# Patient Record
Sex: FEMALE | Race: WHITE | Employment: UNEMPLOYED | URBAN - METROPOLITAN AREA
[De-identification: names, ages, dates, MRNs, and addresses within clinical notes are randomized per-mention and may not be internally consistent; named-entity substitution may affect disease eponyms.]

---

## 2017-03-01 ENCOUNTER — APPOINTMENT (OUTPATIENT)
Dept: MRI IMAGING | Age: 67
End: 2017-03-01
Attending: EMERGENCY MEDICINE
Payer: MEDICARE

## 2017-03-01 ENCOUNTER — HOSPITAL ENCOUNTER (EMERGENCY)
Age: 67
Discharge: HOME | End: 2017-03-01
Attending: EMERGENCY MEDICINE
Payer: MEDICARE

## 2017-03-01 VITALS
WEIGHT: 186 LBS | SYSTOLIC BLOOD PRESSURE: 161 MMHG | TEMPERATURE: 98.5 F | BODY MASS INDEX: 29.89 KG/M2 | HEIGHT: 66 IN | OXYGEN SATURATION: 97 % | HEART RATE: 68 BPM | RESPIRATION RATE: 16 BRPM | DIASTOLIC BLOOD PRESSURE: 86 MMHG

## 2017-03-01 DIAGNOSIS — R42 VERTIGO: Primary | ICD-10-CM

## 2017-03-01 LAB
ALBUMIN SERPL BCP-MCNC: 3.7 G/DL (ref 3.2–4.6)
ALBUMIN/GLOB SERPL: 0.9 {RATIO} (ref 1.2–3.5)
ALP SERPL-CCNC: 67 U/L (ref 50–136)
ALT SERPL-CCNC: 15 U/L (ref 12–65)
ANION GAP BLD CALC-SCNC: 5 MMOL/L (ref 7–16)
AST SERPL W P-5'-P-CCNC: 16 U/L (ref 15–37)
ATRIAL RATE: 63 BPM
BASOPHILS # BLD AUTO: 0 K/UL (ref 0–0.2)
BASOPHILS # BLD: 0 % (ref 0–2)
BILIRUB SERPL-MCNC: 0.4 MG/DL (ref 0.2–1.1)
BUN SERPL-MCNC: 14 MG/DL (ref 8–23)
CALCIUM SERPL-MCNC: 9.2 MG/DL (ref 8.3–10.4)
CALCULATED P AXIS, ECG09: 47 DEGREES
CALCULATED R AXIS, ECG10: -5 DEGREES
CALCULATED T AXIS, ECG11: 43 DEGREES
CHLORIDE SERPL-SCNC: 104 MMOL/L (ref 98–107)
CO2 SERPL-SCNC: 28 MMOL/L (ref 21–32)
CREAT SERPL-MCNC: 0.79 MG/DL (ref 0.6–1)
DIAGNOSIS, 93000: NORMAL
DIASTOLIC BP, ECG02: NORMAL MMHG
DIFFERENTIAL METHOD BLD: ABNORMAL
EOSINOPHIL # BLD: 0.1 K/UL (ref 0–0.8)
EOSINOPHIL NFR BLD: 2 % (ref 0.5–7.8)
ERYTHROCYTE [DISTWIDTH] IN BLOOD BY AUTOMATED COUNT: 14.3 % (ref 11.9–14.6)
GLOBULIN SER CALC-MCNC: 4.3 G/DL (ref 2.3–3.5)
GLUCOSE SERPL-MCNC: 122 MG/DL (ref 65–100)
HCT VFR BLD AUTO: 46.9 % (ref 35.8–46.3)
HGB BLD-MCNC: 15 G/DL (ref 11.7–15.4)
IMM GRANULOCYTES # BLD: 0 K/UL (ref 0–0.5)
IMM GRANULOCYTES NFR BLD AUTO: 0.2 % (ref 0–5)
LYMPHOCYTES # BLD AUTO: 19 % (ref 13–44)
LYMPHOCYTES # BLD: 1.1 K/UL (ref 0.5–4.6)
MCH RBC QN AUTO: 30.1 PG (ref 26.1–32.9)
MCHC RBC AUTO-ENTMCNC: 32 G/DL (ref 31.4–35)
MCV RBC AUTO: 94 FL (ref 79.6–97.8)
MONOCYTES # BLD: 0.5 K/UL (ref 0.1–1.3)
MONOCYTES NFR BLD AUTO: 8 % (ref 4–12)
NEUTS SEG # BLD: 4.3 K/UL (ref 1.7–8.2)
NEUTS SEG NFR BLD AUTO: 71 % (ref 43–78)
P-R INTERVAL, ECG05: 142 MS
PLATELET # BLD AUTO: 203 K/UL (ref 150–450)
PMV BLD AUTO: 11.7 FL (ref 10.8–14.1)
POTASSIUM SERPL-SCNC: 4.5 MMOL/L (ref 3.5–5.1)
PROT SERPL-MCNC: 8 G/DL (ref 6.3–8.2)
Q-T INTERVAL, ECG07: 424 MS
QRS DURATION, ECG06: 88 MS
QTC CALCULATION (BEZET), ECG08: 433 MS
RBC # BLD AUTO: 4.99 M/UL (ref 4.05–5.25)
SODIUM SERPL-SCNC: 137 MMOL/L (ref 136–145)
SYSTOLIC BP, ECG01: NORMAL MMHG
VENTRICULAR RATE, ECG03: 63 BPM
WBC # BLD AUTO: 6.1 K/UL (ref 4.3–11.1)

## 2017-03-01 PROCEDURE — 93005 ELECTROCARDIOGRAM TRACING: CPT | Performed by: EMERGENCY MEDICINE

## 2017-03-01 PROCEDURE — 74011250637 HC RX REV CODE- 250/637: Performed by: EMERGENCY MEDICINE

## 2017-03-01 PROCEDURE — 99284 EMERGENCY DEPT VISIT MOD MDM: CPT | Performed by: EMERGENCY MEDICINE

## 2017-03-01 PROCEDURE — 74011250636 HC RX REV CODE- 250/636: Performed by: EMERGENCY MEDICINE

## 2017-03-01 PROCEDURE — 96374 THER/PROPH/DIAG INJ IV PUSH: CPT | Performed by: EMERGENCY MEDICINE

## 2017-03-01 PROCEDURE — 70551 MRI BRAIN STEM W/O DYE: CPT

## 2017-03-01 PROCEDURE — 80053 COMPREHEN METABOLIC PANEL: CPT | Performed by: EMERGENCY MEDICINE

## 2017-03-01 PROCEDURE — 74011250636 HC RX REV CODE- 250/636

## 2017-03-01 PROCEDURE — 85025 COMPLETE CBC W/AUTO DIFF WBC: CPT | Performed by: EMERGENCY MEDICINE

## 2017-03-01 RX ORDER — DIAZEPAM 10 MG/2ML
5 INJECTION INTRAMUSCULAR
Status: COMPLETED | OUTPATIENT
Start: 2017-03-01 | End: 2017-03-01

## 2017-03-01 RX ORDER — SODIUM CHLORIDE 0.9 % (FLUSH) 0.9 %
5-10 SYRINGE (ML) INJECTION EVERY 8 HOURS
Status: DISCONTINUED | OUTPATIENT
Start: 2017-03-01 | End: 2017-03-01 | Stop reason: HOSPADM

## 2017-03-01 RX ORDER — MECLIZINE HYDROCHLORIDE 25 MG/1
25 TABLET ORAL
Qty: 20 TAB | Refills: 0 | Status: SHIPPED | OUTPATIENT
Start: 2017-03-01 | End: 2017-11-08

## 2017-03-01 RX ORDER — SODIUM CHLORIDE 0.9 % (FLUSH) 0.9 %
5-10 SYRINGE (ML) INJECTION AS NEEDED
Status: DISCONTINUED | OUTPATIENT
Start: 2017-03-01 | End: 2017-03-01 | Stop reason: HOSPADM

## 2017-03-01 RX ORDER — MECLIZINE HYDROCHLORIDE 25 MG/1
25 TABLET ORAL
Status: COMPLETED | OUTPATIENT
Start: 2017-03-01 | End: 2017-03-01

## 2017-03-01 RX ADMIN — MECLIZINE HYDROCHLORIDE 25 MG: 25 TABLET ORAL at 08:48

## 2017-03-01 RX ADMIN — DIAZEPAM 5 MG: 5 INJECTION, SOLUTION INTRAMUSCULAR; INTRAVENOUS at 09:46

## 2017-03-01 NOTE — DISCHARGE INSTRUCTIONS
Vertigo: Care Instructions  Your Care Instructions  Vertigo is the feeling that you or your surroundings are moving when there is no actual movement. It is often described as a feeling of spinning, whirling, falling, or tilting. Vertigo may make you vomit or feel nauseated. You may have trouble standing or walking and may lose your balance. Vertigo is often related to an inner ear problem, but it can have other more serious causes. If vertigo continues, you may need more tests to find its cause. Follow-up care is a key part of your treatment and safety. Be sure to make and go to all appointments, and call your doctor if you are having problems. Its also a good idea to know your test results and keep a list of the medicines you take. How can you care for yourself at home? · Do not lie flat on your back. Prop yourself up slightly. This may reduce the spinning feeling. Keep your eyes open. · Move slowly so that you do not fall. · If your doctor recommends medicine, take it exactly as directed. · Do not drive while you are having vertigo. Certain exercises, called Mosquera-Daroff exercises, can help decrease vertigo. To do Mosquera-Daroff exercises:  · Sit on the edge of a bed or sofa and quickly lie down on the side that causes the worst vertigo. Lie on your side with your ear down. · Stay in this position for at least 30 seconds or until the vertigo goes away. · Sit up. If this causes vertigo, wait for it to stop. · Repeat the procedure on the other side. · Repeat this 10 times. Do these exercises 2 times a day until the vertigo is gone. When should you call for help? Call 911 anytime you think you may need emergency care. For example, call if:  · You passed out (lost consciousness). · You have symptoms of a stroke. These may include:  ¨ Sudden numbness, tingling, weakness, or loss of movement in your face, arm, or leg, especially on only one side of your body. ¨ Sudden vision changes.   ¨ Sudden trouble speaking. ¨ Sudden confusion or trouble understanding simple statements. ¨ Sudden problems with walking or balance. ¨ A sudden, severe headache that is different from past headaches. Call your doctor now or seek immediate medical care if:  · Vertigo occurs with a fever, a headache, or ringing in your ears. · You have new or increased nausea and vomiting. Watch closely for changes in your health, and be sure to contact your doctor if:  · Vertigo gets worse or happens more often. · Vertigo has not gotten better after 2 weeks. Where can you learn more? Go to http://christo-alex.info/. Enter K843 in the search box to learn more about \"Vertigo: Care Instructions. \"  Current as of: July 29, 2016  Content Version: 11.1  © 9224-5452 [employer]. Care instructions adapted under license by [employer] (which disclaims liability or warranty for this information). If you have questions about a medical condition or this instruction, always ask your healthcare professional. [place] [address] any warranty or liability for your use of this information. Epley Maneuver for Vertigo: Exercises  Your Care Instructions  The Epley Maneuver is a series of movements your doctor may use to treat your vertigo. Here are the steps for the exercises. Your doctor or physical therapist will guide you through the movements. A single 10- to 15-minute session often is all that's needed. Crystal debris (canaliths) cause the vertigo. When your head is moved into different positions, the debris moves freely. This may cause your symptoms to stop. How to do the exercises  Step 1    · You will sit on the doctor's exam table. Your legs will be out in front of you. The doctor or physical therapist will turn your head so that it is retirement between looking straight ahead and looking to the side that causes the worst vertigo.   · Without changing your head position, he or she will guide you back quickly. Your shoulders will be on the table. Your head will hang over the edge of the table. At this point, the side of your head that is causing the worst vertigo will face the floor. You'll stay in this position for 30 seconds or until your symptoms stop. Step 2    · Then, the doctor or physical therapist will turn your head to the other side. You don't need to lift your head. The other side of your head will face the floor. You will stay in this position for 30 seconds or until your symptoms stop. Step 3    · The doctor or physical therapist will help you roll your body in the same direction that your head is facing. You will lie on your side. (For example, if you are looking to your right, you will roll onto your right side.) The side that causes the worst symptoms should be facing up. You'll stay in this position for another 30 seconds or until your symptoms stop. Step 4    · The doctor or physical therapist will then help you to sit back up. Your legs will hang off the table on the same side that you were facing. Follow-up care is a key part of your treatment and safety. Be sure to make and go to all appointments, and call your doctor if you are having problems. It's also a good idea to know your test results and keep a list of the medicines you take. Where can you learn more? Go to http://christo-alex.info/. Enter Z398 in the search box to learn more about \"Epley Maneuver for Vertigo: Exercises. \"  Current as of: July 29, 2016  Content Version: 11.1  © 6712-2861 [employer]. Care instructions adapted under license by [employer] (which disclaims liability or warranty for this information). If you have questions about a medical condition or this instruction, always ask your healthcare professional. [address] any warranty or liability for your use of this information.
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
## 2020-01-27 PROBLEM — R23.4 FISSURE IN SKIN OF FOOT: Status: ACTIVE | Noted: 2020-01-27

## 2020-01-27 PROBLEM — L03.115 CELLULITIS OF RIGHT LOWER EXTREMITY: Status: ACTIVE | Noted: 2020-01-27

## 2020-01-27 PROBLEM — M25.552 LEFT HIP PAIN: Status: ACTIVE | Noted: 2020-01-27

## 2020-01-27 PROBLEM — G25.0 ESSENTIAL TREMOR: Status: ACTIVE | Noted: 2020-01-27

## 2020-02-24 PROBLEM — D48.9 NEOPLASM, UNCERTAIN WHETHER BENIGN OR MALIGNANT: Status: ACTIVE | Noted: 2020-02-24

## 2020-05-28 PROBLEM — R53.82 CHRONIC FATIGUE: Status: ACTIVE | Noted: 2020-05-28

## 2021-05-12 ENCOUNTER — APPOINTMENT (OUTPATIENT)
Dept: GENERAL RADIOLOGY | Age: 71
End: 2021-05-12
Attending: EMERGENCY MEDICINE
Payer: MEDICARE

## 2021-05-12 ENCOUNTER — HOSPITAL ENCOUNTER (EMERGENCY)
Age: 71
Discharge: HOME | End: 2021-05-12
Attending: EMERGENCY MEDICINE
Payer: MEDICARE

## 2021-05-12 VITALS
HEART RATE: 88 BPM | OXYGEN SATURATION: 96 % | RESPIRATION RATE: 18 BRPM | WEIGHT: 190 LBS | TEMPERATURE: 97.9 F | HEIGHT: 66 IN | DIASTOLIC BLOOD PRESSURE: 75 MMHG | BODY MASS INDEX: 30.53 KG/M2 | SYSTOLIC BLOOD PRESSURE: 156 MMHG

## 2021-05-12 DIAGNOSIS — S62.101A WRIST FRACTURE, RIGHT, CLOSED, INITIAL ENCOUNTER: Primary | ICD-10-CM

## 2021-05-12 PROCEDURE — 73110 X-RAY EXAM OF WRIST: CPT

## 2021-05-12 PROCEDURE — 74011250637 HC RX REV CODE- 250/637: Performed by: PHYSICIAN ASSISTANT

## 2021-05-12 PROCEDURE — 99283 EMERGENCY DEPT VISIT LOW MDM: CPT

## 2021-05-12 RX ORDER — HYDROCODONE BITARTRATE AND ACETAMINOPHEN 5; 325 MG/1; MG/1
1 TABLET ORAL ONCE
Status: COMPLETED | OUTPATIENT
Start: 2021-05-12 | End: 2021-05-12

## 2021-05-12 RX ORDER — HYDROCODONE BITARTRATE AND ACETAMINOPHEN 5; 325 MG/1; MG/1
1 TABLET ORAL
Qty: 15 TAB | Refills: 0 | Status: SHIPPED | OUTPATIENT
Start: 2021-05-12 | End: 2021-05-17

## 2021-05-12 RX ORDER — NAPROXEN SODIUM 550 MG/1
550 TABLET ORAL 2 TIMES DAILY WITH MEALS
Qty: 20 TAB | Refills: 0 | Status: SHIPPED | OUTPATIENT
Start: 2021-05-12 | End: 2021-05-22

## 2021-05-12 RX ADMIN — HYDROCODONE BITARTRATE AND ACETAMINOPHEN 1 TABLET: 5; 325 TABLET ORAL at 14:35

## 2021-05-12 NOTE — ED TRIAGE NOTES
Pt was painting wall standing on couch and fell back and landed on left wrist. Guarding it and in pain in triage. States she is nauseous from pain. Mask applied to pt.
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
## 2021-05-12 NOTE — DISCHARGE INSTRUCTIONS
Please call orthopedic office for a follow-up appointment for the wrist fracture. Keep the splint dry and loosen it if you develop numbness or you feel like your circulation is being cut off in the hand. Keep the splint on until you go into see the orthopedic doctor. You may take Norco every 6 hours as needed for severe pain. May also take Naproxen every 12 hours for pain and swelling. Return for emergent worsening or worsening symptoms.

## 2021-05-18 PROBLEM — S52.502A CLOSED FRACTURE OF LEFT DISTAL RADIUS: Status: ACTIVE | Noted: 2021-05-18

## 2021-05-18 NOTE — H&P (VIEW-ONLY)
Orthopaedic Hand Clinic Note Name: Ant Meyer YOB: 1950 Gender: female MRN: [id number] CC: Patient referred for evaluation of hand pain HPI: Ant Meyer is a 69 y.o. female right handed with a chief complaint of  left wrist pain. The injury occurred last Wednesday ago when the patient  fell off of the couch while she was painting. She was seen in the emergency department where x-rays were obtained, and the fracture was splinted. She then saw Dr. Claudell Landsberg yesterday, who discussed with her that she would likely require surgical treatment. She presents to me for further evaluation. The pain is located diffusely about the wrist. Denies numbness or paresthesias of the fingers. Evaluation has included x-rays. Treatment to date has included splint. Denies loss of consciousness, head injury or neck pain. ROS/Meds/PSH/PMH/FH/SH: I personally reviewed the patients standard intake form. Pertinents are discussed in the HPI Physical Examination Musculoskeletal Exam: 
Examination of the Left upper extremity demonstrates no open wounds. Negative Tinel's over left carpal tunnel. Sensation is intact throughout, cap refill in all fingers < 5 seconds. Finger motion is limited with mild swelling of the hand and fingers. Tenderness over left distal radius. Positive tenderness over the ulnar aspect of the wrist. No tenderness at elbow, shoulder, clavicle or cervical spine. Imaging / Electrodiagnostic Tests: XR of the left wrist demonstrates a distal radius fracture with 20 degrees of dorsal angulation, intra-articular extension, and an ulnar styloid fracture Assessment:  
1. Other closed intra-articular fracture of distal end of left radius, initial encounter Plan:  
We discussed the diagnosis and different treatment options. We discussed observation, casting, further imaging, and surgical fixation and the risks, benefits and alternatives of all these options.  
 
After discussing in detail the patient elects to proceed with open reduction and internal fixation of left intra-articular distal radius fracture. Risks and benefits of the above surgical procedure were discussed with the patient and/or family, including but not limited to the risk of pain, infection, injury to nerves and blood vessels, stiffness, nonunion, malunion, failure to achieve desired results, possible need for additional surgery, and DVT/PE. The option of doing nothing exists, and additional non-surgical options include chronic analgesics, non-steroidals, and physical and occupational therapy. The risks of non-operative intervention include continued pain and functional limitation. They expressed their understanding and all of their questions were answered to their satisfaction. Informed consent was obtained today. On Exam:  
The patient is alert and oriented; ;  
Lung auscultation is clear bilaterally Heart has RRR without murmurs Patient voiced accordance and understanding of the information provided and the formulated plan. All questions were answered to the patient's satisfaction during the encounter. 4 This is an acute complicated injury Treatment at this time: Elective major surgery with procedural risk factors Irma Goodwin MD 
Orthopaedic Surgery 05/18/21 
10:26 AM

## 2021-05-20 ENCOUNTER — ANESTHESIA EVENT (OUTPATIENT)
Dept: SURGERY | Age: 71
End: 2021-05-20
Payer: MEDICARE

## 2021-05-21 ENCOUNTER — APPOINTMENT (OUTPATIENT)
Dept: GENERAL RADIOLOGY | Age: 71
End: 2021-05-21
Attending: ORTHOPAEDIC SURGERY
Payer: MEDICARE

## 2021-05-21 ENCOUNTER — HOSPITAL ENCOUNTER (OUTPATIENT)
Age: 71
Setting detail: OUTPATIENT SURGERY
Discharge: HOME | End: 2021-05-21
Attending: ORTHOPAEDIC SURGERY | Admitting: ORTHOPAEDIC SURGERY
Payer: MEDICARE

## 2021-05-21 ENCOUNTER — ANESTHESIA (OUTPATIENT)
Dept: SURGERY | Age: 71
End: 2021-05-21
Payer: MEDICARE

## 2021-05-21 VITALS
OXYGEN SATURATION: 95 % | WEIGHT: 190 LBS | HEART RATE: 70 BPM | RESPIRATION RATE: 15 BRPM | SYSTOLIC BLOOD PRESSURE: 139 MMHG | TEMPERATURE: 98.1 F | DIASTOLIC BLOOD PRESSURE: 65 MMHG | BODY MASS INDEX: 30.67 KG/M2

## 2021-05-21 DIAGNOSIS — S52.572A OTHER CLOSED INTRA-ARTICULAR FRACTURE OF DISTAL END OF LEFT RADIUS, INITIAL ENCOUNTER: Primary | ICD-10-CM

## 2021-05-21 LAB — POTASSIUM BLD-SCNC: 4.2 MMOL/L (ref 3.5–5.1)

## 2021-05-21 PROCEDURE — 76060000033 HC ANESTHESIA 1 TO 1.5 HR: Performed by: ORTHOPAEDIC SURGERY

## 2021-05-21 PROCEDURE — 77030037088 HC TUBE ENDOTRACH ORAL NSL COVD-A: Performed by: ANESTHESIOLOGY

## 2021-05-21 PROCEDURE — 77030038840 HC GD AIM SKEL -B: Performed by: ORTHOPAEDIC SURGERY

## 2021-05-21 PROCEDURE — A4565 SLINGS: HCPCS | Performed by: ORTHOPAEDIC SURGERY

## 2021-05-21 PROCEDURE — 74011250637 HC RX REV CODE- 250/637: Performed by: ANESTHESIOLOGY

## 2021-05-21 PROCEDURE — 84132 ASSAY OF SERUM POTASSIUM: CPT

## 2021-05-21 PROCEDURE — 74011250636 HC RX REV CODE- 250/636: Performed by: ORTHOPAEDIC SURGERY

## 2021-05-21 PROCEDURE — 74011250636 HC RX REV CODE- 250/636: Performed by: NURSE ANESTHETIST, CERTIFIED REGISTERED

## 2021-05-21 PROCEDURE — 2709999900 HC NON-CHARGEABLE SUPPLY: Performed by: ORTHOPAEDIC SURGERY

## 2021-05-21 PROCEDURE — 77030003602 HC NDL NRV BLK BBMI -B: Performed by: ANESTHESIOLOGY

## 2021-05-21 PROCEDURE — 76210000063 HC OR PH I REC FIRST 0.5 HR: Performed by: ORTHOPAEDIC SURGERY

## 2021-05-21 PROCEDURE — 77030020275 HC MISC ORTHOPEDIC: Performed by: ORTHOPAEDIC SURGERY

## 2021-05-21 PROCEDURE — 74011250636 HC RX REV CODE- 250/636: Performed by: ANESTHESIOLOGY

## 2021-05-21 PROCEDURE — 74011000250 HC RX REV CODE- 250: Performed by: NURSE ANESTHETIST, CERTIFIED REGISTERED

## 2021-05-21 PROCEDURE — 77030031139 HC SUT VCRL2 J&J -A: Performed by: ORTHOPAEDIC SURGERY

## 2021-05-21 PROCEDURE — C1713 ANCHOR/SCREW BN/BN,TIS/BN: HCPCS | Performed by: ORTHOPAEDIC SURGERY

## 2021-05-21 PROCEDURE — 76942 ECHO GUIDE FOR BIOPSY: CPT | Performed by: ORTHOPAEDIC SURGERY

## 2021-05-21 PROCEDURE — 77030002916 HC SUT ETHLN J&J -A: Performed by: ORTHOPAEDIC SURGERY

## 2021-05-21 PROCEDURE — 77030039425 HC BLD LARYNG TRULITE DISP TELE -A: Performed by: ANESTHESIOLOGY

## 2021-05-21 PROCEDURE — 25609 OPTX DST RD XART FX/EP SEP3+: CPT | Performed by: ORTHOPAEDIC SURGERY

## 2021-05-21 PROCEDURE — 76210000021 HC REC RM PH II 0.5 TO 1 HR: Performed by: ORTHOPAEDIC SURGERY

## 2021-05-21 PROCEDURE — 74011000250 HC RX REV CODE- 250: Performed by: ANESTHESIOLOGY

## 2021-05-21 PROCEDURE — 77030035360 HC BIT DRL SKEL -B: Performed by: ORTHOPAEDIC SURGERY

## 2021-05-21 PROCEDURE — 77030031388 HC WRE K SKEL -B: Performed by: ORTHOPAEDIC SURGERY

## 2021-05-21 PROCEDURE — 76010010054 HC POST OP PAIN BLOCK: Performed by: ORTHOPAEDIC SURGERY

## 2021-05-21 PROCEDURE — 77030035361 HC BIT DRL SLD PA GEMNS SKEL -B: Performed by: ORTHOPAEDIC SURGERY

## 2021-05-21 PROCEDURE — 77030020274 HC MISC IMPL ORTHOPEDIC: Performed by: ORTHOPAEDIC SURGERY

## 2021-05-21 PROCEDURE — 77030000032 HC CUF TRNQT ZIMM -B: Performed by: ORTHOPAEDIC SURGERY

## 2021-05-21 PROCEDURE — 76010000160 HC OR TIME 0.5 TO 1 HR INTENSV-TIER 1: Performed by: ORTHOPAEDIC SURGERY

## 2021-05-21 DEVICE — PEG BNE FIX L18MM DIA2MM DST RAD TI OPT 2 LOK SMOOTH: Type: IMPLANTABLE DEVICE | Site: WRIST | Status: FUNCTIONAL

## 2021-05-21 DEVICE — PEG BNE FIX L20MM DIA2MM DST RAD TI SMOOTH FOR VOLAR: Type: IMPLANTABLE DEVICE | Site: WRIST | Status: FUNCTIONAL

## 2021-05-21 DEVICE — SCR CORT LCK 3.5X11MM --: Type: IMPLANTABLE DEVICE | Site: WRIST | Status: FUNCTIONAL

## 2021-05-21 DEVICE — SCR CORT NLCK 3.5X12MM -- GEMINUS: Type: IMPLANTABLE DEVICE | Site: WRIST | Status: FUNCTIONAL

## 2021-05-21 DEVICE — PLATE BNE 3 H L DST VOLAR RAD TI NAR GEMINUS: Type: IMPLANTABLE DEVICE | Site: WRIST | Status: FUNCTIONAL

## 2021-05-21 DEVICE — PEG FIX L17MM DIA2MM DST RAD TI SMOOTH FOR VOLAR PLATING: Type: IMPLANTABLE DEVICE | Site: WRIST | Status: FUNCTIONAL

## 2021-05-21 DEVICE — PEG BNE FIX L19MM DIA2MM DST RAD TI SMOOTH FOR VOLAR: Type: IMPLANTABLE DEVICE | Site: WRIST | Status: FUNCTIONAL

## 2021-05-21 DEVICE — SCREW BNE L12MM DIA3.5MM CORT DST VOLAR RAD TI LOK FULL: Type: IMPLANTABLE DEVICE | Site: WRIST | Status: FUNCTIONAL

## 2021-05-21 RX ORDER — SUCCINYLCHOLINE CHLORIDE 20 MG/ML
INJECTION INTRAMUSCULAR; INTRAVENOUS AS NEEDED
Status: DISCONTINUED | OUTPATIENT
Start: 2021-05-21 | End: 2021-05-21 | Stop reason: HOSPADM

## 2021-05-21 RX ORDER — DIPHENHYDRAMINE HYDROCHLORIDE 50 MG/ML
12.5 INJECTION, SOLUTION INTRAMUSCULAR; INTRAVENOUS
Status: DISCONTINUED | OUTPATIENT
Start: 2021-05-21 | End: 2021-05-21 | Stop reason: HOSPADM

## 2021-05-21 RX ORDER — ROPIVACAINE HYDROCHLORIDE 5 MG/ML
INJECTION, SOLUTION EPIDURAL; INFILTRATION; PERINEURAL
Status: COMPLETED | OUTPATIENT
Start: 2021-05-21 | End: 2021-05-21

## 2021-05-21 RX ORDER — SODIUM CHLORIDE 0.9 % (FLUSH) 0.9 %
5-40 SYRINGE (ML) INJECTION AS NEEDED
Status: DISCONTINUED | OUTPATIENT
Start: 2021-05-21 | End: 2021-05-21 | Stop reason: HOSPADM

## 2021-05-21 RX ORDER — LIDOCAINE HYDROCHLORIDE 10 MG/ML
0.1 INJECTION INFILTRATION; PERINEURAL AS NEEDED
Status: DISCONTINUED | OUTPATIENT
Start: 2021-05-21 | End: 2021-05-21 | Stop reason: HOSPADM

## 2021-05-21 RX ORDER — ACETAMINOPHEN 500 MG
1000 TABLET ORAL ONCE
Status: COMPLETED | OUTPATIENT
Start: 2021-05-21 | End: 2021-05-21

## 2021-05-21 RX ORDER — SODIUM CHLORIDE, SODIUM LACTATE, POTASSIUM CHLORIDE, CALCIUM CHLORIDE 600; 310; 30; 20 MG/100ML; MG/100ML; MG/100ML; MG/100ML
100 INJECTION, SOLUTION INTRAVENOUS CONTINUOUS
Status: DISCONTINUED | OUTPATIENT
Start: 2021-05-21 | End: 2021-05-21 | Stop reason: HOSPADM

## 2021-05-21 RX ORDER — NALOXONE HYDROCHLORIDE 0.4 MG/ML
0.1 INJECTION, SOLUTION INTRAMUSCULAR; INTRAVENOUS; SUBCUTANEOUS AS NEEDED
Status: DISCONTINUED | OUTPATIENT
Start: 2021-05-21 | End: 2021-05-21 | Stop reason: HOSPADM

## 2021-05-21 RX ORDER — EPHEDRINE SULFATE/0.9% NACL/PF 50 MG/5 ML
SYRINGE (ML) INTRAVENOUS AS NEEDED
Status: DISCONTINUED | OUTPATIENT
Start: 2021-05-21 | End: 2021-05-21 | Stop reason: HOSPADM

## 2021-05-21 RX ORDER — MIDAZOLAM HYDROCHLORIDE 1 MG/ML
2 INJECTION, SOLUTION INTRAMUSCULAR; INTRAVENOUS ONCE
Status: COMPLETED | OUTPATIENT
Start: 2021-05-21 | End: 2021-05-21

## 2021-05-21 RX ORDER — ONDANSETRON 2 MG/ML
INJECTION INTRAMUSCULAR; INTRAVENOUS AS NEEDED
Status: DISCONTINUED | OUTPATIENT
Start: 2021-05-21 | End: 2021-05-21 | Stop reason: HOSPADM

## 2021-05-21 RX ORDER — DEXAMETHASONE SODIUM PHOSPHATE 100 MG/10ML
INJECTION INTRAMUSCULAR; INTRAVENOUS AS NEEDED
Status: DISCONTINUED | OUTPATIENT
Start: 2021-05-21 | End: 2021-05-21 | Stop reason: HOSPADM

## 2021-05-21 RX ORDER — HYDROCODONE BITARTRATE AND ACETAMINOPHEN 5; 325 MG/1; MG/1
1 TABLET ORAL
Qty: 20 TABLET | Refills: 0 | Status: SHIPPED | OUTPATIENT
Start: 2021-05-21 | End: 2021-05-24

## 2021-05-21 RX ORDER — FLUMAZENIL 0.1 MG/ML
0.2 INJECTION INTRAVENOUS
Status: DISCONTINUED | OUTPATIENT
Start: 2021-05-21 | End: 2021-05-21 | Stop reason: HOSPADM

## 2021-05-21 RX ORDER — ONDANSETRON 4 MG/1
4 TABLET, ORALLY DISINTEGRATING ORAL
Qty: 28 TABLET | Refills: 0 | Status: SHIPPED | OUTPATIENT
Start: 2021-05-21

## 2021-05-21 RX ORDER — OXYCODONE HYDROCHLORIDE 5 MG/1
5 TABLET ORAL
Status: DISCONTINUED | OUTPATIENT
Start: 2021-05-21 | End: 2021-05-21 | Stop reason: HOSPADM

## 2021-05-21 RX ORDER — DEXAMETHASONE SODIUM PHOSPHATE 4 MG/ML
INJECTION, SOLUTION INTRA-ARTICULAR; INTRALESIONAL; INTRAMUSCULAR; INTRAVENOUS; SOFT TISSUE
Status: COMPLETED | OUTPATIENT
Start: 2021-05-21 | End: 2021-05-21

## 2021-05-21 RX ORDER — PROPOFOL 10 MG/ML
INJECTION, EMULSION INTRAVENOUS AS NEEDED
Status: DISCONTINUED | OUTPATIENT
Start: 2021-05-21 | End: 2021-05-21 | Stop reason: HOSPADM

## 2021-05-21 RX ORDER — SODIUM CHLORIDE, SODIUM LACTATE, POTASSIUM CHLORIDE, CALCIUM CHLORIDE 600; 310; 30; 20 MG/100ML; MG/100ML; MG/100ML; MG/100ML
75 INJECTION, SOLUTION INTRAVENOUS CONTINUOUS
Status: DISCONTINUED | OUTPATIENT
Start: 2021-05-21 | End: 2021-05-21 | Stop reason: HOSPADM

## 2021-05-21 RX ORDER — LIDOCAINE HYDROCHLORIDE AND EPINEPHRINE 20; 5 MG/ML; UG/ML
INJECTION, SOLUTION EPIDURAL; INFILTRATION; INTRACAUDAL; PERINEURAL AS NEEDED
Status: DISCONTINUED | OUTPATIENT
Start: 2021-05-21 | End: 2021-05-21 | Stop reason: HOSPADM

## 2021-05-21 RX ORDER — SODIUM CHLORIDE 0.9 % (FLUSH) 0.9 %
5-40 SYRINGE (ML) INJECTION EVERY 8 HOURS
Status: DISCONTINUED | OUTPATIENT
Start: 2021-05-21 | End: 2021-05-21 | Stop reason: HOSPADM

## 2021-05-21 RX ORDER — CEFAZOLIN SODIUM/WATER 2 G/20 ML
2 SYRINGE (ML) INTRAVENOUS ONCE
Status: COMPLETED | OUTPATIENT
Start: 2021-05-21 | End: 2021-05-21

## 2021-05-21 RX ORDER — FENTANYL CITRATE 50 UG/ML
100 INJECTION, SOLUTION INTRAMUSCULAR; INTRAVENOUS AS NEEDED
Status: COMPLETED | OUTPATIENT
Start: 2021-05-21 | End: 2021-05-21

## 2021-05-21 RX ORDER — HYDROMORPHONE HYDROCHLORIDE 1 MG/ML
0.5 INJECTION, SOLUTION INTRAMUSCULAR; INTRAVENOUS; SUBCUTANEOUS
Status: DISCONTINUED | OUTPATIENT
Start: 2021-05-21 | End: 2021-05-21 | Stop reason: HOSPADM

## 2021-05-21 RX ADMIN — ACETAMINOPHEN 1000 MG: 500 TABLET ORAL at 06:08

## 2021-05-21 RX ADMIN — MIDAZOLAM 1 MG: 1 INJECTION INTRAMUSCULAR; INTRAVENOUS at 06:44

## 2021-05-21 RX ADMIN — ROPIVACAINE HYDROCHLORIDE 20 ML: 150 INJECTION, SOLUTION EPIDURAL; INFILTRATION; PERINEURAL at 06:45

## 2021-05-21 RX ADMIN — SUCCINYLCHOLINE CHLORIDE 120 MG: 20 INJECTION, SOLUTION INTRAMUSCULAR; INTRAVENOUS at 07:32

## 2021-05-21 RX ADMIN — LIDOCAINE HYDROCHLORIDE,EPINEPHRINE BITARTRATE 10 ML: 20; .005 INJECTION, SOLUTION EPIDURAL; INFILTRATION; INTRACAUDAL; PERINEURAL at 06:45

## 2021-05-21 RX ADMIN — CEFAZOLIN 2 G: 1 INJECTION, POWDER, FOR SOLUTION INTRAVENOUS at 07:35

## 2021-05-21 RX ADMIN — DEXAMETHASONE SODIUM PHOSPHATE 4 MG: 4 INJECTION, SOLUTION INTRAMUSCULAR; INTRAVENOUS at 06:45

## 2021-05-21 RX ADMIN — Medication 10 MG: at 07:53

## 2021-05-21 RX ADMIN — SODIUM CHLORIDE, SODIUM LACTATE, POTASSIUM CHLORIDE, AND CALCIUM CHLORIDE 100 ML/HR: 600; 310; 30; 20 INJECTION, SOLUTION INTRAVENOUS at 06:08

## 2021-05-21 RX ADMIN — DEXAMETHASONE SODIUM PHOSPHATE 10 MG: 10 INJECTION, SOLUTION INTRAMUSCULAR; INTRAVENOUS at 07:48

## 2021-05-21 RX ADMIN — FENTANYL CITRATE 100 MCG: 50 INJECTION, SOLUTION INTRAMUSCULAR; INTRAVENOUS at 06:44

## 2021-05-21 RX ADMIN — PROPOFOL 150 MG: 10 INJECTION, EMULSION INTRAVENOUS at 07:32

## 2021-05-21 RX ADMIN — PROMETHAZINE HYDROCHLORIDE 6.25 MG: 25 INJECTION INTRAMUSCULAR; INTRAVENOUS at 08:30

## 2021-05-21 RX ADMIN — ONDANSETRON 4 MG: 2 INJECTION INTRAMUSCULAR; INTRAVENOUS at 07:43

## 2021-05-21 RX ADMIN — SODIUM CHLORIDE, SODIUM LACTATE, POTASSIUM CHLORIDE, AND CALCIUM CHLORIDE: 600; 310; 30; 20 INJECTION, SOLUTION INTRAVENOUS at 08:18

## 2021-05-21 RX ADMIN — Medication 10 MG: at 08:02

## 2021-05-21 NOTE — OP NOTES
OPERATIVE NOTE    Harry Ford   [id number]  5/21/2021    PRE-OP DIAGNOSIS: Closed fracture of distal end of left radius, unspecified fracture morphology, initial encounter [S52.502A]       POST-OP DIAGNOSIS: Closed fracture of distal end of left radius, unspecified fracture morphology, initial encounter [S52.502A]    LATERALITY: Left    PROCEDURES PERFORMED:    Open treatment of Left intra-articular distal radius fracture with internal fixation of three or more fragments CPT 72271      SURGEON:  Merrill Tejeda MD    IMPLANTS:  * No implants in log *    Procedure(s):  LEFT WRIST THREE-PART INTRA ARTICULAR FRACTURE OPEN REDUCTION INTERNAL FIXATION AXILLARY    Surgeon(s):  Paul Porter MD    Procedure(s):  LEFT WRIST THREE-PART INTRA ARTICULAR FRACTURE OPEN REDUCTION INTERNAL FIXATION AXILLARY    ANESTHESIA: Regional    ESTIMATED BLOOD LOSS: Minimal    COMPLICATIONS: None    TOURNIQUET TIME:   Total Tourniquet Time Documented:  Upper Arm (Left) - 18 minutes  Total: Upper Arm (Left) - 18 minutes      INDICATION FOR PROCEDURE:  Harry Ford sustained the above mentioned injuries as a result of a fall off of her sofa. Surgical and non-surgical treatment options were discussed with the patient and their family, as well as the risk and benefits of each option. Together, we decided to proceed with surgical management of their fracture. Specific to this treatment plan, we discussed in detail surgical risks including scar, pain, bleeding, infection, anesthetic risks, neurovascular injury, nonunion, malunion, hardware loosening or failure, need for further surgery,  weakness, stiffness, risk of death and potential risk of other unforseen complication. The patient did consent to the procedure after discussion of the risks and benefits. DESCRIPTION OF PROCEDURE:  The patient was identified in the holding room. The Left wrist was marked and confirmed as the correct operative site.  They were then brought to the OR and general anesthesia was induced. They were transferred onto the OR table in the supine position. All bony prominences were well padded. SCDs were placed on the bilateral legs throughout the case. A timeout was performed, verifying the correct patient, the correct side and the correct procedure. Antibiotics were then administered, and were redosed during the procedure as needed at indicated intervals. A non-sterile tourniquet was placed on the arm    The upper extremity and shoulder girdle was pre scrubbed and then prepped and draped in routine sterile fashion. An incisional timeout was performed re-confirming the correct patient, surgical site and procedure, as well as verifying antibiotics. A volar FCR approach was utilized. Dissection was taken down through skin and subcutaneous tissue. Hemostasis was achieved with bipolar cautery. The FCR sheath was incised and it was retracted ulnarly. The FCR subsheath was incised. The pronator quadratus was then sharply released from the radius in an L-shaped fashion and was retracted ulnarly to expose the distal radius. Dissection was taken down to bone. The fracture was identified and inspected. Fracture was reduced with manipulation and placement of a [place] elevator into the fracture to unhinge the volar cortex. Reduction was confirmed with fluoroscopy. A skeletal dynamics distal radius plate was chosen and placed along the volar surface. Once plate position was confirmed, the plate was secured to the bone with a nonlocking screw into the oblong hole. The 6 distal locking pegs were placed after unicortical drilling. The plate was secured at the shaft with two additional locking screws. AP and lateral x-rays were obtained, showing adequate fracture reduction with restoration of height, palmar tilt and radial inclination.  Articular wrist view was used after placement of each distal screw to demonstrated that all screws were outside of the joint. Final x-rays were obtained. These showed adequate reduction of the fracture, as well as adequate plate and screw placement and length. The tourniquet was deflated and hemostasis was ensured. The wounds were then thoroughly irrigated and closed in layered fashion with 3-0 vicryl and 4-0 nylon    A sterile dressing was applied followed by a Volar splint    The patient was awakened and taken to PACU in stable condition. All sponge and needle counts were correct at the end of the case. I was present and scrubbed for the entire procedure.        DISPOSITION : Home    MECHANICAL VTE (DVT) PROPHYLAXIS: sequential compression devices    CHEMICAL VTE (DVT) PROPHYLAXIS: None warranted for this case    WEIGHT BEARING STATUS:   NWB on the Left upper extremity      FOLLOW-UP: in 10-14 days for suture removal.       Manuel Conklin MD  Orthopaedic Surgery  05/21/21  8:30 AM

## 2021-05-21 NOTE — INTERVAL H&P NOTE
Update History & Physical 
 
H&P Update: 
Kevin Gaitan was seen and examined. History and physical has been reviewed. The patient has been examined. There have been no significant clinical changes since the completion of the originally dated History and Physical.  Informed consent was obtained today. Leonel John MD 
Orthopaedic Surgery 05/21/21 
7:06 AM

## 2021-05-21 NOTE — ANESTHESIA PREPROCEDURE EVALUATION
Anesthetic History   No history of anesthetic complications            Review of Systems / Medical History  Patient summary reviewed and pertinent labs reviewed    Pulmonary  Within defined limits                 Neuro/Psych         Psychiatric history     Cardiovascular    Hypertension        Dysrhythmias (s/p ablation) : atrial fibrillation      Exercise tolerance: >4 METS     GI/Hepatic/Renal     GERD           Endo/Other        Obesity and arthritis     Other Findings   Comments: SLE         Physical Exam    Airway  Mallampati: II  TM Distance: 4 - 6 cm    Mouth opening: Normal    Comments: Mildly decreased neck ROM due to muscle soreness Cardiovascular    Rhythm: regular  Rate: normal         Dental  No notable dental hx       Pulmonary  Breath sounds clear to auscultation               Abdominal  GI exam deferred       Other Findings            Anesthetic Plan    ASA: 2  Anesthesia type: general  ETT    Post-op pain plan if not by surgeon: peripheral nerve block single    Induction: Intravenous and RSI  Anesthetic plan and risks discussed with: Patient and Spouse      Addendum added after PNB - patient endorsed significant active acid reflux. Decision was made for RSI and ETT. Patient and spouse endorsed understanding.

## 2021-05-21 NOTE — ANESTHESIA POSTPROCEDURE EVALUATION
Procedure(s):  LEFT WRIST THREE-PART INTRA ARTICULAR FRACTURE OPEN REDUCTION INTERNAL FIXATION AXILLARY. total IV anesthesia    Anesthesia Post Evaluation      Multimodal analgesia: multimodal analgesia used between 6 hours prior to anesthesia start to PACU discharge  Patient location during evaluation: PACU  Patient participation: complete - patient participated  Level of consciousness: awake and alert  Pain management: adequate  Airway patency: patent  Anesthetic complications: no  Cardiovascular status: acceptable  Respiratory status: acceptable  Hydration status: acceptable  Post anesthesia nausea and vomiting:  controlled  Final Post Anesthesia Temperature Assessment:  Normothermia (36.0-37.5 degrees C)      INITIAL Post-op Vital signs:   Vitals Value Taken Time   /76 05/21/21 0841   Temp 36.7 °C (98 °F) 05/21/21 0830   Pulse 67 05/21/21 0844   Resp 16 05/21/21 0841   SpO2 97 % 05/21/21 0844   Vitals shown include unvalidated device data.

## 2021-05-21 NOTE — BRIEF OP NOTE
Brief Postoperative Note    Patient: Sally Mccabe  YOB: 1950  MRN: [id number]    Date of Procedure: 5/21/2021     Pre-Op Diagnosis: Closed fracture of distal end of left radius, unspecified fracture morphology, initial encounter [S52.502A]    Post-Op Diagnosis: Same as preoperative diagnosis.       Procedure(s):  LEFT WRIST THREE-PART INTRA ARTICULAR FRACTURE OPEN REDUCTION INTERNAL FIXATION     Surgeon(s):  Teresa Mcknight MD    Surgical Assistant: None    Anesthesia: Regional + General    Estimated Blood Loss (mL): Minimal    Complications: None    Specimens: * No specimens in log *     Implants: * No implants in log *    Drains: * No LDAs found *    Findings: see full op note    Electronically Signed by Marielle Ferrari MD on 5/21/2021 at 8:29 AM

## 2021-05-21 NOTE — DISCHARGE INSTRUCTIONS
Postoperative  Instructions:      Weightbearing or Lifting: You  are  not  allowed  to  lift  any  weight  or  bear  any  weight  on  the  surgical  extremity  until  cleared  by  your  surgeon. Dressing  instructions:    Keep  your  dressing  and/or  splint  clean  and  dry  at  all  times. It  will  be  removed  at  your  first  post-operative  appointment or therapy apppointment. Your  stitches  will  be  removed  at  this  visit. Showering  Instructions:  May  shower  But keep surgical dressing clean and dry until removed as explained above. Pain  Control:  - You  have  been  given  a  prescription  to  be  taken  as  directed  for  post-operative  pain  control. In  addition,  elevate  the  operative  extremity  above  the  heart  at  all  times  to  prevent  swelling  and  throbbing  pain. - If you develop constipation while taking narcotic pain medications (Norco, Hydrocodone, Percocet, Oxycodone, Dilaudid, Hydromorphone) take  over-the-counter  Colace,  100mg  by  mouth  twice  a  Day. - Nausea  is  a  common  side  effect  of  many  pain  medications. You  will  want  to  eat something  before  taking  your  pain  medicine  to  help  prevent  Nausea. - If  you  are  taking  a  prescription  pain  medication  that  contains  acetaminophen,  we  recommend  that  you  do  not  take  additional  over  the  counter  acetaminophen  (Tylenol®). Other  pain  relieving  options:   - Using  a  cold  pack  to  ice  the  affected  area  a  few  times  a  day  (15  to  20  minutes  at  a  time)  can  help  to  relieve  pain,  reduce  swelling  and  bruising.      - Elevation  of  the  affected  area  can  also  help  to  reduce  pain  and  swelling. Did  you  receive  a  nerve  Block? A  nerve  block  can  provide  pain  relief  for  one  hour  to  two  days  after  your  surgery.   As  long  as  the  nerve  block  is  working,  you  will  experience  little  or  no sensation  in  the  area  the  surgeon  operated  on. As  the  nerve  block  wears  off,  you  will  begin  to  experience  pain  or  discomfort. It  is  very  important  that  you  begin  taking  your  prescribed  pain  medication  before  the  nerve  block  fully  wears  off. The first sign that the nerve block is wearing off is tingling in your fingers. Treating  your  pain  at  the  first  sign  of  the  block  wearing  off  will  ensure  your  pain  is  better  controlled  and  more  tolerable  when  full-sensation  returns. Do  not  wait  until  the  pain  is  intolerable,  as  the  medicine  will  be  less  effective. It  is  better  to  treat  pain  in  advance  than  to  try  and  catch  up. General  Anesthesia or Sedation:      If  you  did  not  receive  a  nerve  block  during  your  surgery,  you  will  need  to  start  taking  your  pain  medication  shortly  after  your  surgery  and  should  continue  to  do  so  as  prescribed  by  your  surgeon. Please  call  [phone]  with any concern and ask to speak with Trung Villarreal. Concerning problems include:      -  Excessive  redness  of  the  incisions      -  Drainage  for  more  than  2  Days after surgery or any foul smelling drainage  -  Fever  of  more  than  101.5  F      Please  call  [phone]  if  you  do  not  receive  or  are  unsure  of  your  first  follow-up  appointment. You  should  see  the  doctor  10-14  days  after  your  Surgery. Thank you for choosing me and [address] [place] [address] for your care. I will go above and beyond to ensure you receive the best care possible. Andrew Drake MD    TYPICAL SIDE EFFECTS OF PAIN MEDICATION:     Constipation: Drink lots of fluids. Over the counter stool softener if needed.    Nausea: Take pain medication with food. Call your doctor with persistent nausea. ACTIVITY  · As tolerated and as directed by your doctor.    · Bathe or shower as directed by your doctor. DIET  · Day of surgery: Clear liquids until no nausea or vomiting; small portion, light diet [place] foods (ex: baked chicken, plain rice, grits, scrambled eggs, toast). Nothing greasy, fried or spicy today. · Advance to regular diet on second day, unless your doctor orders otherwise. · If nausea and vomiting continues, call your doctor. PAIN  · Take pain medication as directed by your doctor. · DO NOT take aspirin or blood thinners unless directed by your doctor. AFTER ANESTHESIA   · For the first 24 hours: DO NOT Drive, Drink alcoholic beverages, or Make important decisions. · Be aware of dizziness following anesthesia and while taking pain medication. PATIENT INSTRUCTIONS:    After general anesthesia or intravenous sedation, for 24 hours or while taking prescription Narcotics:  · Limit your activities  · Do not drive and operate hazardous machinery  · Do not make important personal or business decisions  · Do  not drink alcoholic beverages  · If you have not urinated within 8 hours after discharge, please contact your surgeon on call. *  Please give a list of your current medications to your Primary Care Provider. *  Please update this list whenever your medications are discontinued, doses are      changed, or new medications (including over-the-counter products) are added. *  Please carry medication information at all times in case of emergency situations. Preventing Infection at Home  We care about preventing infection and avoiding the spread of germs - not only when you are in the hospital but also when you return home. When you return home from the hospital, its important to take the following steps to help prevent infection and avoid spreading germs that could infect you and others. Ask everyone in your home to follow these guidelines, too.     Clean Your Hands  · Clean your hands whenever your hands are visibly dirty, before you eat, before or after touching your mouth, nose or eyes, and before preparing food. Clean them after contact with body fluids, using the restroom, touching animals or changing diapers. · When washing hands, wet them with warm water and work up a lather. Rub hands for at least 15 seconds, then rinse them and pat them dry with a clean towel or paper towel. · When using hand sanitizers, it should take about 15 seconds to rub your hands dry. If not, you probably didnt apply enough . Cover Your Sneeze or Cough  Germs are released into the air whenever you sneeze or cough. To prevent the spread of infection:  · Turn away from other people before coughing or sneezing. · Cover your mouth or nose with a tissue when you cough or sneeze. Put the tissue in the trash. · If you dont have a tissue, cough or sneeze into your upper sleeve, not your hands. · Always clean your hands after coughing or sneezing. Care for Wounds  Your skin is your bodys first line of defense against germs, but an open wound leaves an easy way for germs to enter your body. To prevent infection:  · Clean your hands before and after changing wound dressings, and wear gloves to change dressings if recommended by your doctor. · Take special care with IV lines or other devices inserted into the body. If you must touch them, clean your hands first.  · Follow any specific instructions from your doctor to care for your wounds. Contact your doctor if you experience any signs of infection, such as fever or increased redness at the surgical or wound site. Keep a Clean Home  · Clean or wipe commonly touched hard surfaces like door handles, sinks, tabletops, phones and TV remotes. · Use products labeled disinfectant to kill harmful bacteria and viruses. · Use a clean cloth or paper towel to clean and dry surfaces. Wiping surfaces with a dirty dishcloth, sponge or towel will only spread germs.   · Never share toothbrushes, galvez, drinking glasses, utensils, razor blades, face cloths or bath towels to avoid spreading germs. · Be sure that the linens that you sleep on are clean. · Keep pets away from wounds and wash your hands after touching pets, their toys or bedding. We care about you and your health. Remember, preventing infections is a team effort between you, your family, friends and health care providers. These are general instructions for a healthy lifestyle:    No smoking/ No tobacco products/ Avoid exposure to second hand smoke  Surgeon General's Warning:  Quitting smoking now greatly reduces serious risk to your health. Obesity, smoking, and sedentary lifestyle greatly increases your risk for illness  A healthy diet, regular physical exercise & weight monitoring are important for maintaining a healthy lifestyle  You may be retaining fluid if you have a history of heart failure or if you experience any of the following symptoms:  Weight gain of 3 pounds or more overnight or 5 pounds in a week, increased swelling in our hands or feet or shortness of breath while lying flat in bed. Please call your doctor as soon as you notice any of these symptoms; do not wait until your next office visit. Recognize signs and symptoms of STROKE:  F-face looks uneven  A-arms unable to move or move unevenly  S-speech slurred or non-existent  T-time-call 911 as soon as signs and symptoms begin-DO NOT go       Back to bed or wait to see if you get better-TIME IS BRAIN. Learning About Coronavirus [phone])  Coronavirus [phone]): Overview  What is coronavirus (COVID-19)? The coronavirus disease (COVID-19) is caused by a virus. It is an illness that was first found in [place], [place], in December 2019. It has since spread worldwide. The virus can cause fever, cough, and trouble breathing. In severe cases, it can cause pneumonia and make it hard to breathe without help. It can cause death. Coronaviruses are a large group of viruses. They cause the common cold.  They also cause more serious illnesses like [place] respiratory syndrome (MERS) and severe acute respiratory syndrome (SARS). COVID-19 is caused by a novel coronavirus. That means it's a new type that has not been seen in people before. This virus spreads person-to-person through droplets from coughing and sneezing. It can also spread when you are close to someone who is infected. And it can spread when you touch something that has the virus on it, such as a doorknob or a tabletop. What can you do to protect yourself from coronavirus (COVID-19)? The best way to protect yourself from getting sick is to:  · Avoid areas where there is an outbreak. · Avoid contact with people who may be infected. · Wash your hands often with soap or alcohol-based hand sanitizers. · Avoid crowds and try to stay at least 6 feet away from other people. · Wash your hands often, especially after you cough or sneeze. Use soap and water, and scrub for at least 20 seconds. If soap and water aren't available, use an alcohol-based hand . · Avoid touching your mouth, nose, and eyes. What can you do to avoid spreading the virus to others? To help avoid spreading the virus to others:  · Cover your mouth with a tissue when you cough or sneeze. Then throw the tissue in the trash. · Use a disinfectant to clean things that you touch often. · Wear a cloth face cover if you have to go to public areas. · Stay home if you are sick or have been exposed to the virus. Don't go to school, work, or public areas. And don't use public transportation, ride-shares, or taxis unless you have no choice. · If you are sick:  ? Leave your home only if you need to get medical care. But call the doctor's office first so they know you're coming. And wear a face cover. ? Wear the face cover whenever you're around other people. It can help stop the spread of the virus when you cough or sneeze. ? Clean and disinfect your home every day.  Use household  and disinfectant wipes or sprays. Take special care to clean things that you grab with your hands. These include doorknobs, remote controls, phones, and handles on your refrigerator and microwave. And don't forget countertops, tabletops, bathrooms, and computer keyboards. When to call for help  [id number] anytime you think you may need emergency care. For example, call if:  · You have severe trouble breathing. (You can't talk at all.)  · You have constant chest pain or pressure. · You are severely dizzy or lightheaded. · You are confused or can't think clearly. · Your face and lips have a blue color. · You pass out (lose consciousness) or are very hard to wake up. Call your doctor now if you develop symptoms such as:  · Shortness of breath. · Fever. · Cough. If you need to get care, call ahead to the doctor's office for instructions before you go. Make sure you wear a face cover to prevent exposing other people to the virus. Where can you get the latest information? The following health organizations are tracking and studying this virus. Their websites contain the most up-to-date information. Loy Lopez also learn what to do if you think you may have been exposed to the virus. · U.S. Centers for Disease Control and Prevention (CDC): The CDC provides updated news about the disease and travel advice. The website also tells you how to prevent the spread of infection. www.cdc.gov  · World Health Organization [facility]): WHO offers information about the virus outbreaks. WHO also has travel advice. www.who.int  Current as of: May 8, 2020               Content Version: 12.5  © 2006-2020 [employer]. Care instructions adapted under license by [employer] (which disclaims liability or warranty for this information).  If you have questions about a medical condition or this instruction, always ask your healthcare professional. Adele Jensen any warranty or liability for your use of this information.

## 2021-05-21 NOTE — PROGRESS NOTES
Prayer provided during Pre-op as requested by the the patient.       Bubba Kiran, [address] [facility], [facility]

## 2021-05-21 NOTE — ANESTHESIA PROCEDURE NOTES
Peripheral Block    Start time: 5/21/2021 6:44 AM  End time: 5/21/2021 6:49 AM  Performed by: Erik Johnson MD  Authorized by: Erik Johnson MD       Pre-procedure:    Indications: at surgeon's request and post-op pain management    Preanesthetic Checklist: patient identified, risks and benefits discussed, site marked, timeout performed, anesthesia consent given and patient being monitored    Timeout Time: 06:44 EDT          Block Type:   Block Type:  Supraclavicular  Laterality:  Left  Monitoring:  Standard ASA monitoring, continuous pulse ox, heart rate, responsive to questions, oxygen and frequent vital sign checks  Injection Technique:  Single shot  Procedures: ultrasound guided    Patient Position: seated  Prep: chlorhexidine    Needle Type:  Stimuplex  Needle Gauge:  20 G  Needle Localization:  Ultrasound guidance  Medication Injected:  Ropivacaine (PF) (NAROPIN)(0.5%) 5 mg/mL injection, 20 mL  dexamethasone (DECADRON) 4 mg/mL injection, 4 mg  Med Admin Time: 5/21/2021 6:45 AM    Assessment:  Number of attempts:  1  Injection Assessment:  Incremental injection every 5 mL, local visualized surrounding nerve on ultrasound, negative aspiration for blood, negative aspiration for CSF, no paresthesia, no intravascular symptoms and ultrasound image on chart  Patient tolerance:  Patient tolerated the procedure well with no immediate complications

## 2021-06-16 ENCOUNTER — HOSPITAL ENCOUNTER (OUTPATIENT)
Dept: PHYSICAL THERAPY | Age: 71
Discharge: HOME | End: 2021-06-16
Payer: MEDICARE

## 2021-06-16 DIAGNOSIS — S52.572A OTHER CLOSED INTRA-ARTICULAR FRACTURE OF DISTAL END OF LEFT RADIUS, INITIAL ENCOUNTER: ICD-10-CM

## 2021-06-16 PROCEDURE — 97162 PT EVAL MOD COMPLEX 30 MIN: CPT

## 2021-06-16 PROCEDURE — 97110 THERAPEUTIC EXERCISES: CPT
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
## 2021-06-16 NOTE — PROGRESS NOTES
Micaela Marino  :   Payor: Chayito Logan / Plan: Via [employer] / Product Type: Managed Care Medicare /  [address] [facility]. Bailey Dennison, [address] [place] [address], [facility], [address] [place] [address]  [phone]   [phone]                                                          Venkatesh Turner MD      OUTPATIENT PHYSICAL THERAPY: Daily Treatment Note 2021 Visit Count:  1    Tx Diagnosis:  Left wrist stiffness (M25.532)  Pain left wrist (M25.532)      Pre-treatment Symptoms/Complaints: See Initial Eval Dated 21 for more details. Pain: Initial:3/10  Medications Last Reviewed:  2021     Post Session: 2/10   Updated Objective Findings: See Initial Eval for more details. TREATMENT:   THERAPEUTIC EXERCISE: (15 minutes):  Exercises per grid below to improve mobility, strength and balance. Required minimal visual, verbal and manual cues to promote proper body alignment and promote proper body posture. Progressed resistance and complexity of movement as indicated. Date:  2021 Date:   Date:     Activity/Exercise Parameters Parameters Parameters   Education HEP, POC, PT goals, anatomy/pathology     Ball rolls wrist flex/ext on table 3 min     Towel crunches 3 min     Dowel pronation/supinatio 20 x                             THERAPEUTIC ACTIVITY: ( 0 minutes): Activities per gid below to improve functional movement related mobility, strength and balance to improve neuro-muscular carryover to daily functional activities for improving patient's quality of life. Required visual, verbal and manual cues to promote proper body alignment and promote proper body posture/mechanics. Progressed resistance and complexity of movement as indicated.      Date:  2021 Date:   Date:     Activity/Exercise Parameters Parameters Parameters                                                                               MANUAL THERAPY: (0 minutes): Joint mobilization, Soft tissue mobilization was utilized and necessary because of the patient's restricted joint motion and restricted motion of soft tissue mobility. Date  6/16/2021    Technique Used Grade  Level # Time(s) Effect while being performed                                                                 HEP Log Date 1. Ball roll, dowel sup/pron, towel crunches 6/16/2021   2.  6/16/2021   3. 6/16/2021   4.    5.           [employer] Portal  Treatment/Session Summary:    Response to Treatment: Pt demonstrated understanding of POC and initial HEP. No increase in pain or adverse reactions. Communication/Consultation:  POC, HEP, PT goals, Faxed initial evaluation to MD.   Equipment provided today: HEP Handout   Recommendations/Intent for next treatment session:   Next visit will focus on Manual Therapy soft tissue mobilization range of motion, light functional activities. Treatment Plan of Care Effective Dates: 6/16/2021 TO 8/15/2021 (60 days).   Frequency/Duration: 2 times a week for 60 Days             Total Treatment Billable Duration:   15  Rx plus Eval   PT Patient Time In/Time Out  Time In: 1532  Time Out: 211 [facility], PT  CHT    Future Appointments   Date Time Provider Dara Fryi   6/21/2021  3:30 PM Ziggy Pollock PT [facility]   6/23/2021  3:45 PM Ziggy Pollock PT [facility]   6/29/2021  8:40 AM Mj Acosta MD [facility] POAI POA   11/23/2021 10:40 AM Alexandria Chen MD [facility] PRE PRE

## 2021-06-21 ENCOUNTER — APPOINTMENT (OUTPATIENT)
Dept: PHYSICAL THERAPY | Age: 71
End: 2021-06-21
Payer: MEDICARE

## 2021-06-23 ENCOUNTER — HOSPITAL ENCOUNTER (OUTPATIENT)
Dept: PHYSICAL THERAPY | Age: 71
Discharge: HOME | End: 2021-06-23
Payer: MEDICARE

## 2021-06-23 PROCEDURE — 97110 THERAPEUTIC EXERCISES: CPT

## 2021-06-23 PROCEDURE — 97140 MANUAL THERAPY 1/> REGIONS: CPT

## 2021-06-23 NOTE — PROGRESS NOTES
Samuel Terry  :   Payor: Zaida Matos / Plan: Via [employer] / Product Type: Managed Care Medicare /  [address] [place]. [facility]., [address] [place], [address]  [phone]   [phone]                                                          Valentina Zuluaga MD      OUTPATIENT PHYSICAL THERAPY: Daily Treatment Note 2021 Visit Count:  2    Tx Diagnosis:  Left wrist stiffness (M25.532)  Pain left wrist (M25.532)      Pre-treatment Symptoms/Complaints: Patient reports a little sore from doing puzzels at home   Pain: Initial:3/10  Medications Last Reviewed:  2021     Post Session: 2/10   Updated Objective Findings: Wrist AROM Ext 48  Flex 45        TREATMENT:   THERAPEUTIC EXERCISE: (28 minutes):  Exercises per grid below to improve mobility, strength and balance. Required minimal visual, verbal and manual cues to promote proper body alignment and promote proper body posture. Progressed resistance and complexity of movement as indicated. Date:  2021 Date:  2021 Date:     Activity/Exercise Parameters Parameters Parameters   Education HEP, POC, PT goals, anatomy/pathology Review 8min    Ball rolls wrist flex/ext on table 3 min 5min    Towel crunches 3 min 3min    Dowel pronation/supination 20 x 30x    Wrist Flex Ext  20x    Ulnar Radial deviation   20x                THERAPEUTIC ACTIVITY: ( 0 minutes): Activities per gid below to improve functional movement related mobility, strength and balance to improve neuro-muscular carryover to daily functional activities for improving patient's quality of life. Required visual, verbal and manual cues to promote proper body alignment and promote proper body posture/mechanics. Progressed resistance and complexity of movement as indicated.      Date:  2021 Date:   Date:     Activity/Exercise Parameters Parameters Parameters                           [place]                                                 MANUAL THERAPY: (10 minutes): Joint mobilization, Soft tissue mobilization was utilized and necessary because of the patient's restricted joint motion and restricted motion of soft tissue mobility. Date  6/23/2021    Technique Used Grade  Level # Time(s) Effect while being performed          PROM  Wrist left 10min  Improved mobility                                                   HEP Log Date 1. Ball roll, dowel sup/pron, towel crunches 6/16/2021   2.  6/23/2021   3. 6/23/2021   4.    5.           [employer] Portal  Treatment/Session Summary:    Response to Treatment: Patient had good tolerance to increase with improved AROM   Communication/Consultation:  Reviewed HEP and activity   Equipment provided today: Not today   Recommendations/Intent for next treatment session:   Next visit will focus on Manual Therapy soft tissue mobilization range of motion, light functional activities. Treatment Plan of Care Effective Dates: 6/16/2021 TO 8/15/2021 (60 days).   Frequency/Duration: 2 times a week for 60 Days             Total Treatment Billable Duration:   38  Rx   PT Patient Time In/Time Out  Time In: 3865  Time Out: Ga Amos PT  CHT    Future Appointments   Date Time Provider Dara Badillo   6/29/2021  8:40 AM London Osorio MD [facility] POAI POA   6/30/2021  2:30 PM Elenor Ax, PT [facility]   7/7/2021  3:30 PM Papenfuss, Olamide Gillham, PT [facility]   7/12/2021  3:30 PM Papenfuss, Olamide Gillham, PT [facility]   7/14/2021  3:30 PM Papenfuss, Olamide Gillham, PT [facility]   7/19/2021  3:30 PM Papenfuss, Olamide Gillham, PT [facility]   7/21/2021  3:30 PM Papenfuss, Olamide Gillham, PT [facility]   7/26/2021  3:30 PM Papenfuss, Olamide Gillham, PT [facility]   7/28/2021  3:30 PM Papenfuss, Olamide Gillham, PT [facility]   11/23/2021 10:40 AM Russ Nava MD [facility] PRE PRE

## 2021-06-30 ENCOUNTER — HOSPITAL ENCOUNTER (OUTPATIENT)
Dept: PHYSICAL THERAPY | Age: 71
Discharge: HOME | End: 2021-06-30
Payer: MEDICARE

## 2021-06-30 PROCEDURE — 97110 THERAPEUTIC EXERCISES: CPT

## 2021-06-30 NOTE — PROGRESS NOTES
Duong Ramirez  :   Payor: Felix Patient / Plan: Via [employer] / Product Type: Managed Care Medicare /  [facility] at [address] [place] [address]. [facility], [address] [place], [id number] [place] [address]  [phone]   [phone]                                                          Antonio Santiago MD      OUTPATIENT PHYSICAL THERAPY: Daily Treatment Note 2021 Visit Count:  3    Tx Diagnosis:  Left wrist stiffness (M25.532)  Pain left wrist (M25.532)      Pre-treatment Symptoms/Complaints: Pt reports Dr. Raquel Mckee D/c from her care and lifted all restrictions. Pt reports x-rays went well and Dr. Raquel Mckee was please with wrist ROM. Pt no longer has to wear wrist brace. Pain: Initial:3/10  Medications Last Reviewed:  2021     Post Session: 2/10   Updated Objective Findings: None today        TREATMENT:   THERAPEUTIC EXERCISE: (40 minutes):  Exercises per grid below to improve mobility, strength and balance. Required minimal visual, verbal and manual cues to promote proper body alignment and promote proper body posture. Progressed resistance and complexity of movement as indicated. Date:  2021 Date:  2021 Date:  21   Activity/Exercise Parameters Parameters Parameters   Education HEP, POC, PT goals, anatomy/pathology Review 8min    Ball rolls wrist flex/ext on table 3 min 5min 5 min   Towel crunches 3 min 3min    Dowel pronation/supination 20 x 30x    Wrist Flex Ext  20x    Ulnar Radial deviation   20x    UBE   3 min fwd/backward lvl 1.5   Serratus initiation   Yellow  3 x 5 reps   Ring flexion   3 min   Isometric ER   Yellow  3 x 5 reps   Isometric IR   Yellow  3 x 5 reps   Ring flexion   With emphasis on self wrist ROM at end ranges  5 min         THERAPEUTIC ACTIVITY: ( 0 minutes):  Activities per gid below to improve functional movement related mobility, strength and balance to improve neuro-muscular carryover to daily functional activities for improving patient's quality of life. Required visual, verbal and manual cues to promote proper body alignment and promote proper body posture/mechanics. Progressed resistance and complexity of movement as indicated. Date:  6/30/2021 Date:   Date:     Activity/Exercise Parameters Parameters Parameters                                                                               MANUAL THERAPY: (10 minutes): Joint mobilization, Soft tissue mobilization was utilized and necessary because of the patient's restricted joint motion and restricted motion of soft tissue mobility. Date  6/30/2021    Technique Used Grade  Level # Time(s) Effect while being performed          PROM  Wrist left 10min  Improved mobility                                                   HEP Log Date 1. Ball roll, dowel sup/pron, towel crunches 6/16/2021   2.  6/30/2021   3. 6/30/2021   4.    5.           [email or web address] Portal  Treatment/Session Summary:    Response to Treatment: Patient has mild anxiety with initiation of stregntehning program. THErapist encourages pt through Dr. James Adame lifting restrictions and sound images. Pt with decrease shoulder girdle strength noted and able to tolerate isometric holds of wrist in neutral position. Communication/Consultation:  Reviewed HEP and activity   Equipment provided today: Not today   Recommendations/Intent for next treatment session:   Next visit will focus on Manual Therapy soft tissue mobilization range of motion, light functional activities. Treatment Plan of Care Effective Dates: 6/16/2021 TO 8/15/2021 (60 days).   Frequency/Duration: 2 times a week for 60 Days         Total Treatment Billable Duration:  40 Rx   PT Patient Time In/Time Out  Time In: 1430  Time Out: 1303 Neha Pa PT      Future Appointments   Date Time Provider Dara Badillo   7/7/2021  3:30 PM Eusebio Paiz, PT [facility]   7/12/2021  3:30 PM Riki Brown, PT [facility]   7/14/2021  3:30 PM Papenfuss, Sofi Cellar, PT [facility]   7/19/2021  3:30 PM Papenfuss, Sofi Cellar, PT [facility]   7/21/2021  3:30 PM Papenfuss, [place] Cellar, PT [facility]   7/26/2021  3:30 PM Papenfuss, Sofi Cellar, PT [facility]   7/28/2021  3:30 PM Papenfuss, Sofi Cellar, PT [facility]   11/23/2021 10:40 AM Coben, Temple Sandifer, MD [facility] PRE PRE

## 2021-07-07 ENCOUNTER — HOSPITAL ENCOUNTER (OUTPATIENT)
Dept: PHYSICAL THERAPY | Age: 71
Discharge: HOME | End: 2021-07-07
Payer: MEDICARE

## 2021-07-07 PROCEDURE — 97140 MANUAL THERAPY 1/> REGIONS: CPT

## 2021-07-07 PROCEDURE — 97110 THERAPEUTIC EXERCISES: CPT

## 2021-07-07 NOTE — PROGRESS NOTES
Bill Rodas  :   Payor: Iram Ricketts / [facility]: [facility]  / Product Type: Managed Care Medicare /  [address] [place] [address] at [address] [place] [address]. [facility], [address], [address] [place] [address]  [phone]   [phone]                                                          Doug Maria MD      OUTPATIENT PHYSICAL THERAPY: Daily Treatment Note 2021 Visit Count:  4    Tx Diagnosis:  Left wrist stiffness (M25.532)  Pain left wrist (M25.532)      Pre-treatment Symptoms/Complaints: Pt reports mild stiffness into extensoin   Pain: Initial:3/10  Medications Last Reviewed:  2021     Post Session: 2/10   Updated Objective Findings:  right 44 lbs, left 25 lbs. Full rotation,        TREATMENT:   THERAPEUTIC EXERCISE: (25 minutes):  Exercises per grid below to improve mobility, strength and balance. Required minimal visual, verbal and manual cues to promote proper body alignment and promote proper body posture. Progressed resistance and complexity of movement as indicated. Date:  2021 Date:  2021 Date:  21 Date  21   Activity/Exercise Parameters Parameters Parameters    Education HEP, POC, PT goals, anatomy/pathology Review 8min     Ball rolls wrist flex/ext on table 3 min 5min 5 min    Towel crunches 3 min 3min     Dowel pronation/supination 20 x 30x     Wrist Flex Ext  20x  1# 2 x 10    Ulnar Radial deviation   20x     UBE   3 min fwd/backward lvl 1.5 6 min hills L 4   Wall slides     2 x 10   Serratus initiation   Yellow  3 x 5 reps    Ring flexion   3 min    Isometric ER   Yellow  3 x 5 reps    Isometric IR   Yellow  3 x 5 reps    Ring flexion   With emphasis on self wrist ROM at end ranges  5 min    Shoulder 4 way band at door wrist neutral    20 x ea way medium resistance                       THERAPEUTIC ACTIVITY: ( 0 minutes):  Activities per gid below to improve functional movement related mobility, strength and balance to improve neuro-muscular carryover to daily functional activities for improving patient's quality of life. Required visual, verbal and manual cues to promote proper body alignment and promote proper body posture/mechanics. Progressed resistance and complexity of movement as indicated. Date:  7/7/2021 Date:   Date:     Activity/Exercise Parameters Parameters Parameters                                                                               MANUAL THERAPY: (15 minutes): Joint mobilization, Soft tissue mobilization was utilized and necessary because of the patient's restricted joint motion and restricted motion of soft tissue mobility. Date  7/7/2021    Technique Used Grade  Level # Time(s) Effect while being performed          PROM  Wrist left 15 min  Improved mobility                                                   HEP Log Date 1. Ball roll, dowel sup/pron, towel crunches 6/16/2021   2. Shoulder band 4 way wrist curls 7/7/2021   3. 7/7/2021   4.    5.           [employer] Portal  Treatment/Session Summary:    Response to Treatment: Patient had minimal pain complaints   Communication/Consultation:  Reviewed HEP and activity see HEP log   Equipment provided today: Not today   Recommendations/Intent for next treatment session:   Next visit will focus on Manual Therapy soft tissue mobilization range of motion, light functional activities. Treatment Plan of Care Effective Dates: 6/16/2021 TO 8/15/2021 (60 days).   Frequency/Duration: 2 times a week for 60 Days         Total Treatment Billable Duration:  40 Rx   PT Patient Time In/Time Out  Time In: 1530  Time Out: 211 [facility], PT      Future Appointments   Date Time Provider Dara Badillo   7/14/2021  3:30 PM Kimberly Paiz, PT [facility] AND [facility]   7/21/2021  3:30 PM Kimberly Paiz, PT [facility]   7/26/2021  3:30 PM Kimberly Paiz, PT [facility]   7/28/2021  3:30 PM Erika Paulson, PT [facility]   11/23/2021 10:40 AM Ondina Nava MD [facility]

## 2021-07-12 ENCOUNTER — APPOINTMENT (OUTPATIENT)
Dept: PHYSICAL THERAPY | Age: 71
End: 2021-07-12
Payer: MEDICARE

## 2021-07-14 ENCOUNTER — HOSPITAL ENCOUNTER (OUTPATIENT)
Dept: PHYSICAL THERAPY | Age: 71
Discharge: HOME | End: 2021-07-14
Payer: MEDICARE

## 2021-07-14 PROCEDURE — 97110 THERAPEUTIC EXERCISES: CPT

## 2021-07-14 PROCEDURE — 97140 MANUAL THERAPY 1/> REGIONS: CPT

## 2021-07-14 NOTE — PROGRESS NOTES
Nicolasa Humphrey  :   Payor: Bonnie Bojorquez / Plan: Titus Jesus / Product Type: Managed Care Medicare /  [address] [place] [address] at [address] [place] [address]. [facility], [address], [address] [place] [address]  [phone]   [phone]                                                          Steve Desouza MD      OUTPATIENT PHYSICAL THERAPY: Daily Treatment Note 2021 Visit Count:  5    Tx Diagnosis:  Left wrist stiffness (M25.532)  Pain left wrist (M25.532)      Pre-treatment Symptoms/Complaints: Pt reports mild stiffness into extensoin   Pain: Initial:3/10  Medications Last Reviewed:  2021     Post Session: 2/10   Updated Objective Findings:  right 44 lbs, left 25 lbs. Full rotation, 60 extension, 50 flexion        TREATMENT:   THERAPEUTIC EXERCISE: (25 minutes):  Exercises per grid below to improve mobility, strength and balance. Required minimal visual, verbal and manual cues to promote proper body alignment and promote proper body posture. Progressed resistance and complexity of movement as indicated.      Date:  2021 Date:  2021 Date:  21 Date  21 Date  21   Activity/Exercise Parameters Parameters Parameters     Education HEP, POC, PT goals, anatomy/pathology Review 8min      Ball rolls wrist flex/ext on table 3 min 5min 5 min     Towel crunches 3 min 3min      Dowel pronation/supination 20 x 30x      Wrist Flex Ext  20x  1# 2 x 10  2#    Ulnar Radial deviation   20x      UBE   3 min fwd/backward lvl 1.5 6 min hills L 4 6 min L 4    Wall slides     2 x 10 2 x 10    Serratus initiation   Yellow  3 x 5 reps     Ring flexion   3 min     Isometric ER   Yellow  3 x 5 reps     Isometric IR   Yellow  3 x 5 reps     Ring flexion   With emphasis on self wrist ROM at end ranges  5 min     Shoulder 4 way band at door wrist neutral    20 x ea way medium resistance 20 x ea    Darnell carry      5# 280'                 THERAPEUTIC ACTIVITY: ( 0 minutes): Activities per gid below to improve functional movement related mobility, strength and balance to improve neuro-muscular carryover to daily functional activities for improving patient's quality of life. Required visual, verbal and manual cues to promote proper body alignment and promote proper body posture/mechanics. Progressed resistance and complexity of movement as indicated. Date:  7/14/2021 Date:   Date:     Activity/Exercise Parameters Parameters Parameters                                                                               MANUAL THERAPY: (15 minutes): Joint mobilization, Soft tissue mobilization was utilized and necessary because of the patient's restricted joint motion and restricted motion of soft tissue mobility. Date  7/14/2021    Technique Used Grade  Level # Time(s) Effect while being performed          PROM  Wrist left 15 min  Improved mobility                                                   HEP Log Date 1. Ball roll, dowel sup/pron, towel crunches 6/16/2021   2. Shoulder band 4 way wrist curls 7/7/2021   3. 7/14/2021   4.    5.           [employer] Portal  Treatment/Session Summary:    Response to Treatment: Patient had minimal pain complaints, gradual increase in range of motion   Communication/Consultation:  Reviewed HEP and activity see HEP log   Equipment provided today: Not today   Recommendations/Intent for next treatment session:   Next visit will focus on Manual Therapy soft tissue mobilization range of motion, light functional activities. Treatment Plan of Care Effective Dates: 6/16/2021 TO 8/15/2021 (60 days).   Frequency/Duration: 2 times a week for 60 Days         Total Treatment Billable Duration:  40 Rx   PT Patient Time In/Time Out  Time In: 1531  Time Out: 211 [facility], PT      Future Appointments   Date Time Provider Dara Badillo   7/21/2021  3:30 PM Yasmeen Paiz, PT [facility] 7/26/2021  3:30 PM Alejandra Paiz, PT [facility]   7/28/2021  3:30 PM Alejandra Paiz, PT [facility]   11/23/2021 10:40 AM Clovis Nava MD [facility]

## 2021-07-19 ENCOUNTER — APPOINTMENT (OUTPATIENT)
Dept: PHYSICAL THERAPY | Age: 71
End: 2021-07-19
Payer: MEDICARE

## 2021-07-21 ENCOUNTER — HOSPITAL ENCOUNTER (OUTPATIENT)
Dept: PHYSICAL THERAPY | Age: 71
Discharge: HOME | End: 2021-07-21
Payer: MEDICARE

## 2021-07-21 PROCEDURE — 97110 THERAPEUTIC EXERCISES: CPT

## 2021-07-21 PROCEDURE — 97140 MANUAL THERAPY 1/> REGIONS: CPT

## 2021-07-21 NOTE — PROGRESS NOTES
Sophie Hannah  :   Payor: Sudhakar Mccauleyona / Plan: Via [employer] / Product Type: Managed Care Medicare /  [address] at [address]. [facility], [address], [address]  [phone]   [phone]                                                          Matt Andersen MD      OUTPATIENT PHYSICAL THERAPY: Daily Treatment Note 2021 Visit Count:  6    Tx Diagnosis:  Left wrist stiffness (M25.532)  Pain left wrist (M25.532)      Pre-treatment Symptoms/Complaints: Pt reports mild stiffness into extensoin   Pain: Initial:3/10  Medications Last Reviewed:  2021     Post Session: 2/10   Updated Objective Findings:  right 49 lbs, left 39 lbs. Full rotation, 70 extension, 60 flexion        TREATMENT:   THERAPEUTIC EXERCISE: (25 minutes):  Exercises per grid below to improve mobility, strength and balance. Required minimal visual, verbal and manual cues to promote proper body alignment and promote proper body posture. Progressed resistance and complexity of movement as indicated.      Date:  2021 Date:  2021 Date:  21 Date  21 Date  21 Date  21    Activity/Exercise Parameters Parameters Parameters       Education HEP, POC, PT goals, anatomy/pathology Review 8min        Ball rolls wrist flex/ext on table 3 min 5min 5 min       Towel crunches 3 min 3min        Dowel pronation/supination 20 x 30x        Wrist Flex Ext  20x  1# 2 x 10  2#  3# 10 x    Ulnar Radial deviation   20x        UBE   3 min fwd/backward lvl 1.5 6 min hills L 4 6 min L 4  6 min L 4    Wall slides     2 x 10 2 x 10  20 x    Serratus initiation   Yellow  3 x 5 reps       Ring flexion   3 min       Isometric ER   Yellow  3 x 5 reps       Isometric IR   Yellow  3 x 5 reps       Ring flexion   With emphasis on self wrist ROM at end ranges  5 min       Shoulder 4 way band at door wrist neutral    20 x ea way medium resistance 20 x ea Beka Sumner carry      5# 280'     Rows cable      20# 20 x     Lat pull down cable       20# 20 x          THERAPEUTIC ACTIVITY: ( 0 minutes): Activities per gid below to improve functional movement related mobility, strength and balance to improve neuro-muscular carryover to daily functional activities for improving patient's quality of life. Required visual, verbal and manual cues to promote proper body alignment and promote proper body posture/mechanics. Progressed resistance and complexity of movement as indicated. Date:  7/21/2021 Date:   Date:     Activity/Exercise Parameters Parameters Parameters                                                                               MANUAL THERAPY: (15 minutes): Joint mobilization, Soft tissue mobilization was utilized and necessary because of the patient's restricted joint motion and restricted motion of soft tissue mobility. Date  7/21/2021    Technique Used Grade  Level # Time(s) Effect while being performed          PROM  Wrist left 15 min  Improved mobility                                                   HEP Log Date 1. Ball roll, dowel sup/pron, towel crunches 6/16/2021   2. Shoulder band 4 way wrist curls 7/7/2021   3. 7/21/2021   4.    5.           [employer] Portal  Treatment/Session Summary:    Response to Treatment: Patient had minimal pain complaints, gradual increase in range of motion   Communication/Consultation:  Reviewed HEP and activity see HEP log   Equipment provided today: Not today   Recommendations/Intent for next treatment session:   Next visit will focus on Manual Therapy soft tissue mobilization range of motion, light functional activities. Treatment Plan of Care Effective Dates: 6/16/2021 TO 8/15/2021 (60 days).   Frequency/Duration: 2 times a week for 60 Days         Total Treatment Billable Duration:  40 Rx   PT Patient Time In/Time Out  Time In: 1526  Time Out: 211 [facility],  Future Appointments   Date Time Provider Dara Badillo   7/21/2021  3:30 PM Grazyna Paiz, PT [facility] AND [facility]   7/26/2021  3:30 PM Grazyna Paiz, PT [facility]   7/28/2021  3:30 PM Grazyna Paiz, PT [facility]   11/23/2021 10:40 AM Ifeanyi Nava MD [facility]

## 2021-07-26 ENCOUNTER — HOSPITAL ENCOUNTER (OUTPATIENT)
Dept: PHYSICAL THERAPY | Age: 71
Discharge: HOME | End: 2021-07-26
Payer: MEDICARE

## 2021-07-26 DIAGNOSIS — S52.572A OTHER CLOSED INTRA-ARTICULAR FRACTURE OF DISTAL END OF LEFT RADIUS, INITIAL ENCOUNTER: ICD-10-CM

## 2021-07-26 PROCEDURE — 97110 THERAPEUTIC EXERCISES: CPT

## 2021-07-26 PROCEDURE — 97140 MANUAL THERAPY 1/> REGIONS: CPT

## 2021-07-26 NOTE — PROGRESS NOTES
Natasha Fang  :   Payor: Tony Ferndale / Plan: Via [employer]  / Product Type: Managed Care Medicare /  [address] [place] [address] at [address] [place] [address]. [facility]., [address], [address] [place] [address] Road  [phone]   [phone]                                                          Hilda Clifford MD      OUTPATIENT PHYSICAL THERAPY: Daily Treatment Note 2021 Visit Count:  7    Tx Diagnosis:  Left wrist stiffness (M25.532)  Pain left wrist (M25.532)      Pre-treatment Symptoms/Complaints: Pt reports LOB while cleaning toilet was stooped over. Fell bent left wrist back had sharp pain but went away. Pain: Initial:3/10  Medications Last Reviewed:  2021     Post Session: 2/10   Updated Objective Findings:  right 60 lbs, left 30 lbs. Full rotation, 70 extension, 60 flexion        TREATMENT:   THERAPEUTIC EXERCISE: (25 minutes):  Exercises per grid below to improve mobility, strength and balance. Required minimal visual, verbal and manual cues to promote proper body alignment and promote proper body posture. Progressed resistance and complexity of movement as indicated.      Date:  2021 Date:  2021 Date:  21 Date  21 Date  21 Date  21   Activity/Exercise Parameters Parameters Parameters       Education HEP, POC, PT goals, anatomy/pathology Review 8min        Ball rolls wrist flex/ext on table 3 min 5min 5 min       Towel crunches 3 min 3min        Dowel pronation/supination 20 x 30x        Wrist Flex Ext  20x  1# 2 x 10  2#  3# 10 x 3# 2 x 10   Ulnar Radial deviation   20x        UBE   3 min fwd/backward lvl 1.5 6 min hills L 4 6 min L 4  6 min L 4 6 min   Wall slides     2 x 10 2 x 10  20 x 20 x   Serratus initiation   Yellow  3 x 5 reps       Ring flexion   3 min       Isometric ER   Yellow  3 x 5 reps       Isometric IR   Yellow  3 x 5 reps       Overhead press       3# 10 x   Ring flexion   With emphasis on self wrist ROM at end ranges  5 min       Shoulder 4 way band at door wrist neutral    20 x ea way medium resistance 20 x ea      Darnell carry      5# 280'  10# 280'   Rows cable      20# 20 x  23# 3 x 10    Lat pull down cable       20# 20 x 23# 3 x 10          THERAPEUTIC ACTIVITY: ( 0 minutes): Activities per gid below to improve functional movement related mobility, strength and balance to improve neuro-muscular carryover to daily functional activities for improving patient's quality of life. Required visual, verbal and manual cues to promote proper body alignment and promote proper body posture/mechanics. Progressed resistance and complexity of movement as indicated. Date:  7/26/2021 Date:   Date:     Activity/Exercise Parameters Parameters Parameters                                                                               MANUAL THERAPY: (15 minutes): Joint mobilization, Soft tissue mobilization was utilized and necessary because of the patient's restricted joint motion and restricted motion of soft tissue mobility. Date  7/26/2021    Technique Used Grade  Level # Time(s) Effect while being performed          PROM  Wrist left 15 min  Improved mobility                                                   HEP Log Date 1. Ball roll, dowel sup/pron, towel crunches 6/16/2021   2. Shoulder band 4 way wrist curls 7/7/2021   3. 7/26/2021   4.    5.           [employer] Portal  Treatment/Session Summary:    Response to Treatment: Patient had minimal pain complaints, gradual increase in range of motion   Communication/Consultation:  Reviewed HEP and activity see HEP log   Equipment provided today: Not today   Recommendations/Intent for next treatment session:   Next visit will focus on Manual Therapy soft tissue mobilization range of motion, light functional activities. Treatment Plan of Care Effective Dates: 6/16/2021 TO 8/15/2021 (60 days).   Frequency/Duration: 2 times a week for 60 Days         Total Treatment Billable Duration:  40 Rx   PT Patient Time In/Time Out  Time In: 5653  Time Out: 0809  Cha Roegrs, PT      Future Appointments   Date Time Provider Dara Badillo   7/28/2021  3:30 PM Venu Paiz, PT [facility] AND [facility]   11/23/2021 10:40 AM Arsenio Nava MD [facility]

## 2021-07-28 ENCOUNTER — HOSPITAL ENCOUNTER (OUTPATIENT)
Dept: PHYSICAL THERAPY | Age: 71
Discharge: HOME | End: 2021-07-28
Payer: MEDICARE

## 2021-07-28 PROCEDURE — 97110 THERAPEUTIC EXERCISES: CPT

## 2021-07-28 PROCEDURE — 97140 MANUAL THERAPY 1/> REGIONS: CPT

## 2021-07-28 NOTE — PROGRESS NOTES
Eamon Jshang  :   Payor: Vertell Claude / Plan: Via [facility]  / Product Type: Managed Care Medicare /  [address] [place] [address] at [address] [place] [address]. [facility], [address], [address] [place] [address]  [phone]   [phone]                                                          Edmundo Leung MD      OUTPATIENT PHYSICAL THERAPY: Daily Treatment Note 2021 Visit Count:  8    Tx Diagnosis:  Left wrist stiffness (M25.532)  Pain left wrist (M25.532)      Pre-treatment Symptoms/Complaints:  DASH 22   Pain: Initial:3/10  Medications Last Reviewed:  2021     Post Session: 2/10   Updated Objective Findings:  right 60 lbs, left 30 lbs. Full rotation, 70 extension, 60 flexion        TREATMENT:   THERAPEUTIC EXERCISE: (25 minutes):  Exercises per grid below to improve mobility, strength and balance. Required minimal visual, verbal and manual cues to promote proper body alignment and promote proper body posture. Progressed resistance and complexity of movement as indicated.      Date:  2021 Date:  2021 Date:  21 Date  21 Date  21 Date  21   Activity/Exercise Parameters Parameters Parameters        Education HEP, POC, PT goals, anatomy/pathology Review 8min         Ball rolls wrist flex/ext on table 3 min 5min 5 min        Towel crunches 3 min 3min         Dowel pronation/supination 20 x 30x         Wrist Flex Ext  20x  1# 2 x 10  2#  3# 10 x 3# 2 x 10 3# 2 x 10   Ulnar Radial deviation   20x         UBE   3 min fwd/backward lvl 1.5 6 min hills L 4 6 min L 4  6 min L 4 6 min 6 min L 5   Wall slides     2 x 10 2 x 10  20 x 20 x    Serratus initiation   Yellow  3 x 5 reps        Ring flexion   3 min        Isometric ER   Yellow  3 x 5 reps        Isometric IR   Yellow  3 x 5 reps        Overhead press       3# 10 x 4#  10 x   Ring flexion   With emphasis on self wrist ROM at end ranges  5 min        Shoulder 4 way band at door wrist neutral    20 x ea way medium resistance 20 x ea       Darnell carry      5# 280'  10# 280'    Rows cable      20# 20 x  23# 3 x 10     Lat pull down cable       20# 20 x 23# 3 x 10                 MANUAL THERAPY: (15 minutes): Joint mobilization, Soft tissue mobilization was utilized and necessary because of the patient's restricted joint motion and restricted motion of soft tissue mobility. Date  7/28/2021    Technique Used Grade  Level # Time(s) Effect while being performed          PROM  Wrist left 15 min  Improved mobility                                                   HEP Log Date 1. Ball roll, dowel sup/pron, towel crunches 6/16/2021   2. Shoulder band 4 way wrist curls 7/7/2021   3. 7/28/2021   4.    5.           "[employer] Portal  Treatment/Session Summary:    Response to Treatment: Patient had minimal pain complaints, gradual increase in range of motion   Communication/Consultation:  DC HEP   Equipment provided today: Not today   Recommendations/Intent for next treatment session:   DC to HEP         Treatment Plan of Care Effective Dates: 6/16/2021 TO 8/15/2021 (60 days).   Frequency/Duration: 2 times a week for 60 Days         Total Treatment Billable Duration:  40 Rx   PT Patient Time In/Time Out  Time In: 1540  Time Out: R Carlos Cooper 2, PT      Future Appointments   Date Time Provider Dara Badillo   11/23/2021 10:40 AM Gerardo Nava MD [facility]

## 2021-07-28 NOTE — THERAPY DISCHARGE
Marilyn Grover  : [id number]      Payor: Jason Tucker / Plan: [employer] / Product Type: Managed Care Medicare /    [address] at [address] [place]. [facility] [address] [place], [address] [place] [address] Road  [phone]   [phone]              OUTPATIENT PHYSICAL THERAPY:Discharge 2021  Visit # 8   ICD-10: Treatment Diagnosis:   Left wrist stiffness (M25.532)  Pain left wrist (M25.532)                   Precautions/Allergies:   Naproxen and Tape [adhesive]   Fall Risk Score: 5 (? 5 = High Risk)  MD Orders: Eval and Treat  MEDICAL/REFERRING DIAGNOSIS:  Other closed intra-articular fracture of distal end of left radius, initial encounter   DATE OF ONSET: 21  REFERRING PHYSICIAN: Carol Soliman MD  RETURN PHYSICIAN APPOINTMENT: TBD by patient      INITIAL ASSESSMENT:  Ms. Marilyn Grover has attended 8 physical therapy sessions  including initial evaluation. Ms. Yvonne Carbajal has achieved majority of her goals and has been discharged to her home program.     TREATMENT PLAN:  Effective Dates: 2021 TO 8/15/2021 (60 days). Frequency/Duration: 2 times a week for 60 Days  GOALS: (Goals have been discussed and agreed upon with patient.)   Short-Term Goals~4 weeks  Goal Met   1. Marilyn Grover will decrease pain to 2 to allow patient to perform self care tasks. 1.  [x] Date:   2. Marilyn Grover will increase motion in left wrist by 40 degrees to improve functional use of upper extremity in ADL activities. 2.  [x] Date:   3. Marilyn Grover will Increase  strength of left wrist by 10 pounds to allow patient to  and lift objects during self care activities. 3.  [x] Date:   4. Marilyn Grover will improve DASH score by 10 points   4. [x] Date:   5      Long Term Goals~8 weeks Goal Met   1. Marilyn Grover will Decrease pain to 1 to allow patient to perform all household and work tasks 1. [] Date:   2.  Marilyn Grover will Increase motion of left wrist to WNL to allow patient to perform all ADL activities. 2.  [x] Date:   3. Lucillie Gang will Increase  strength  by 20 pounds to allow patient to , lift, hold, and carry heavy objects 3. [x] Date:   4. Lucillie Gang will improve DASH score by 20 points 4. [x] Date: 16 point improvement            Outcome Measure: Tool Used: Disabilities of the Arm, Shoulder and Hand (DASH) Questionnaire - Quick Version  Score:  Initial: 38/55  Most Recent: 22/55 (Date:7/28/21 )   Interpretation of Score: The DASH is designed to measure the activities of daily living in person's with upper extremity dysfunction or pain. Each section is scored on a 1-5 scale, 5 representing the greatest disability. The scores of each section are added together for a total score of 55. Medical Necessity:   · Skilled intervention required due to deficits and impairments seen upon initial evaluation affecting patient's participation in ADLs and functional tasks. *  ·   Reason for Services/Other Comments:  · Patient requires skilled intervention due to deficits and impairments seen upon initial evaluation affecting patient's participation in ADLs and functional tasks.     Future Appointments   Date Time Provider Dara Badillo   11/23/2021 10:40 AM Tiffany Nava MD [facility]       Total Treatment Duration:      PT Patient Time In/Time Out  Time In: 1540  Time Out: 1620        Thank you for this referral,  Dank Paiz, PT CHT                       HISTORY:   EXAMINATION:   Observation/Orthostatic Postural Assessment:         Mild edema left hand  Palpation:      Immature surgical scar volar forearm over distal radius        Active Range of Motion  7/28/2021     Fingers     Able to perform full composite fist     Thumb      Right  Left   Radial Abduction  60   Palmar Abduction  60   Opposition  Base of 5 th   MCP Extension  0   MCP Flexion  45   IP ext/flex  0/45     Wrist and Elbow      Right  Left Elbow Flexion  WNL   Elbow Extension  WNL   Wrist Extension  70   Wrist Flexion  60   Ulnar Deviation  25   Radial Deviation  15   Supination  80   Pronation  90              Strength  Date: 7/28/2021       Right  Left   Elbow Flexion 5 5   Elbow Extension 5 4   Wrist Extension 5 4    Wrist Flexion 5 4   Ulnar Deviation 5 4   Radial Deviation 5 4   Supination 5 4   Pronation 5 4          60   30 lbs   Key Pinch     2-point     3 jaw judson            Neurological Screen: Assessed @ Initial Visit    Radiating symptoms?  No  Denies parathesias    Functional Mobility:  Within normal limits                      remember to save as eval

## 2021-09-09 ENCOUNTER — HOSPITAL ENCOUNTER (OUTPATIENT)
Dept: MAMMOGRAPHY | Age: 71
Discharge: HOME | End: 2021-09-09
Attending: FAMILY MEDICINE
Payer: MEDICARE

## 2021-09-09 DIAGNOSIS — S62.101A WRIST FRACTURE, RIGHT, CLOSED, INITIAL ENCOUNTER: ICD-10-CM

## 2021-09-09 PROCEDURE — 77080 DXA BONE DENSITY AXIAL: CPT

## 2022-02-02 PROBLEM — F51.04 PSYCHOPHYSIOLOGIC INSOMNIA: Status: ACTIVE | Noted: 2022-02-02

## 2022-02-02 PROBLEM — Z00.00 ENCOUNTER FOR ANNUAL WELLNESS EXAM IN MEDICARE PATIENT: Status: ACTIVE | Noted: 2022-02-02

## 2022-02-02 PROBLEM — Z12.31 SCREENING MAMMOGRAM, ENCOUNTER FOR: Status: ACTIVE | Noted: 2022-02-02

## 2022-02-02 PROBLEM — R41.3 MEMORY DIFFICULTY: Status: ACTIVE | Noted: 2022-02-02

## 2022-02-25 ENCOUNTER — HOSPITAL ENCOUNTER (OUTPATIENT)
Dept: MAMMOGRAPHY | Age: 72
Discharge: HOME | End: 2022-02-25
Attending: FAMILY MEDICINE
Payer: MEDICARE

## 2022-02-25 DIAGNOSIS — Z12.31 SCREENING MAMMOGRAM, ENCOUNTER FOR: ICD-10-CM

## 2022-02-25 PROCEDURE — 77067 SCR MAMMO BI INCL CAD: CPT

## 2022-03-04 PROBLEM — Z12.31 SCREENING MAMMOGRAM, ENCOUNTER FOR: Status: RESOLVED | Noted: 2022-02-02 | Resolved: 2022-03-04

## 2022-03-18 PROBLEM — F51.04 PSYCHOPHYSIOLOGIC INSOMNIA: Status: ACTIVE | Noted: 2022-02-02

## 2022-03-19 PROBLEM — L03.115 CELLULITIS OF RIGHT LOWER EXTREMITY: Status: ACTIVE | Noted: 2020-01-27

## 2022-03-19 PROBLEM — G25.0 ESSENTIAL TREMOR: Status: ACTIVE | Noted: 2020-01-27

## 2022-03-19 PROBLEM — M25.552 LEFT HIP PAIN: Status: ACTIVE | Noted: 2020-01-27

## 2022-03-19 PROBLEM — R53.82 CHRONIC FATIGUE: Status: ACTIVE | Noted: 2020-05-28

## 2022-03-19 PROBLEM — D48.9 NEOPLASM, UNCERTAIN WHETHER BENIGN OR MALIGNANT: Status: ACTIVE | Noted: 2020-02-24

## 2022-03-19 PROBLEM — S52.502A CLOSED FRACTURE OF LEFT DISTAL RADIUS: Status: ACTIVE | Noted: 2021-05-18

## 2022-03-19 PROBLEM — R41.3 MEMORY DIFFICULTY: Status: ACTIVE | Noted: 2022-02-02

## 2022-03-20 PROBLEM — S62.101A WRIST FRACTURE, RIGHT, CLOSED, INITIAL ENCOUNTER: Status: ACTIVE | Noted: 2021-05-12

## 2022-03-20 PROBLEM — R23.4 FISSURE IN SKIN OF FOOT: Status: ACTIVE | Noted: 2020-01-27

## 2022-05-03 PROBLEM — M16.0 BILATERAL HIP JOINT ARTHRITIS: Status: ACTIVE | Noted: 2022-05-03

## 2022-05-03 PROBLEM — B35.1 ONYCHOMYCOSIS: Status: ACTIVE | Noted: 2022-05-03

## 2022-06-01 ENCOUNTER — OFFICE VISIT (OUTPATIENT)
Dept: FAMILY MEDICINE CLINIC | Facility: CLINIC | Age: 72
End: 2022-06-01
Payer: MEDICARE

## 2022-06-01 ENCOUNTER — TELEPHONE (OUTPATIENT)
Dept: INTERNAL MEDICINE CLINIC | Facility: CLINIC | Age: 72
End: 2022-06-01

## 2022-06-01 VITALS
RESPIRATION RATE: 14 BRPM | WEIGHT: 182 LBS | HEART RATE: 58 BPM | SYSTOLIC BLOOD PRESSURE: 126 MMHG | BODY MASS INDEX: 29.25 KG/M2 | OXYGEN SATURATION: 95 % | HEIGHT: 66 IN | DIASTOLIC BLOOD PRESSURE: 71 MMHG

## 2022-06-01 DIAGNOSIS — N32.89 BLADDER SPASM: ICD-10-CM

## 2022-06-01 DIAGNOSIS — R82.90 ABNORMAL FINDING ON URINALYSIS: ICD-10-CM

## 2022-06-01 DIAGNOSIS — J34.0 FURUNCLE OF NOSE: ICD-10-CM

## 2022-06-01 DIAGNOSIS — N32.89 BLADDER SPASM: Primary | ICD-10-CM

## 2022-06-01 DIAGNOSIS — M19.049 HAND ARTHRITIS: ICD-10-CM

## 2022-06-01 DIAGNOSIS — R32 URINARY INCONTINENCE, UNSPECIFIED TYPE: ICD-10-CM

## 2022-06-01 LAB
BILIRUBIN, URINE, POC: NEGATIVE
BLOOD URINE, POC: NEGATIVE
GLUCOSE URINE, POC: NEGATIVE
KETONES, URINE, POC: NEGATIVE
LEUKOCYTE ESTERASE, URINE, POC: NORMAL
NITRITE, URINE, POC: NEGATIVE
PH, URINE, POC: 6 (ref 4.6–8)
PROTEIN,URINE, POC: NEGATIVE
SPECIFIC GRAVITY, URINE, POC: 1.02 (ref 1–1.03)
URINALYSIS CLARITY, POC: CLEAR
URINALYSIS COLOR, POC: YELLOW
UROBILINOGEN, POC: NORMAL

## 2022-06-01 PROCEDURE — 1123F ACP DISCUSS/DSCN MKR DOCD: CPT | Performed by: FAMILY MEDICINE

## 2022-06-01 PROCEDURE — 99214 OFFICE O/P EST MOD 30 MIN: CPT | Performed by: FAMILY MEDICINE

## 2022-06-01 PROCEDURE — 81003 URINALYSIS AUTO W/O SCOPE: CPT | Performed by: FAMILY MEDICINE

## 2022-06-01 RX ORDER — OXYBUTYNIN CHLORIDE 10 MG/1
10 TABLET, EXTENDED RELEASE ORAL DAILY
Qty: 30 TABLET | Refills: 3 | Status: SHIPPED | OUTPATIENT
Start: 2022-06-01 | End: 2022-10-06

## 2022-06-01 ASSESSMENT — ENCOUNTER SYMPTOMS
CHEST TIGHTNESS: 0
SHORTNESS OF BREATH: 0
BLOOD IN STOOL: 0
ABDOMINAL PAIN: 0

## 2022-06-01 ASSESSMENT — PATIENT HEALTH QUESTIONNAIRE - PHQ9
SUM OF ALL RESPONSES TO PHQ QUESTIONS 1-9: 0
2. FEELING DOWN, DEPRESSED OR HOPELESS: 0
SUM OF ALL RESPONSES TO PHQ QUESTIONS 1-9: 0
1. LITTLE INTEREST OR PLEASURE IN DOING THINGS: 0
SUM OF ALL RESPONSES TO PHQ9 QUESTIONS 1 & 2: 0

## 2022-06-01 NOTE — TELEPHONE ENCOUNTER
Patient called stating that mupirocin 2% ointment cost $48 that she is not able to afford it. Wanting to know that if there is any alternative for it.

## 2022-06-01 NOTE — TELEPHONE ENCOUNTER
There is no alternative that I know of. Can she ask the pharmacist if there is a cheaper *size* tube to order?

## 2022-06-01 NOTE — PROGRESS NOTES
[facility]  _______________________________________  MD Iván Menezes, KATHERIN Lopez MD Nelly Hight, [address], [address] [place] [address]  Phone: [phone]  Fax: [phone]    Tai Montemayor (:  ) is a 66 y.o. female,Established patient, here for evaluation of the following chief complaint(s):  Incontinence (urine only x worsening recently ), Cyst (two small spots on palm of left hand x 2 months, no pain ), and Abrasion (c/o irritation and sorenss on left nostril, intermittent for while )         ASSESSMENT/PLAN:    1. Bladder spasm  No obvious infection. Sending urine for Cx to confirm since she had mild LE. Will try her on ditropan. If side effects are significant we can try her on antimuscarinic.   - AMB POC URINALYSIS DIP STICK AUTO W/O MICRO  - oxybutynin (DITROPAN XL) 10 mg extended release tablet; Take 1 tablet by mouth daily  Dispense: 30 tablet; Refill: 3  - Culture, Urine; Future    2. Hand arthritis  Continue activity as tolerated, supportive care, will monitor. 3. Furuncle of nose  Will do topical bactroban. If no improvement in 2 weeks consider ENT referral.   - mupirocin (BACTROBAN) 2 % ointment; Apply topically 3 times daily. Dispense: 3 g; Refill: 5    4. Abnormal finding on urinalysis    - Culture, Urine; Future    FU as previously scheduled      Subjective   SUBJECTIVE/OBJECTIVE:    Here with multiple issues for a same day appointment:    Urinary Incontinence: Has bene indolently getting worse for the last several weeks. States she will have some sx of bladder spasm where she will suddenly have the urge to go and if she is not quick to get to the bathroom she will have incontinence as soon as she gets up. She is S/P 2 SVDs. Has not kept up with her kegel exercises. Left Hand Masses: Noticed two spots on her L pal a couple months ago she wants me to check out.   had chronic contractions of the hands. Feruncle? Has a rough spot that is TTP in the L nare she would like me to look at. She does trim her nose hairs. UA notable for LE only. Review of Systems   Constitutional: Negative for chills and fever. Respiratory: Negative for chest tightness and shortness of breath. Cardiovascular: Negative for chest pain. Gastrointestinal: Negative for abdominal pain and blood in stool. Genitourinary: Positive for enuresis and urgency. Negative for hematuria. Neurological: Negative for syncope. Objective   Physical Exam  Vitals and nursing note reviewed. Constitutional:       Appearance: Normal appearance. HENT:      Head: Normocephalic and atraumatic. Right Ear: External ear normal.      Left Ear: External ear normal.      Nose:      Comments: Small feruncle proximal inferior L nare     Mouth/Throat:      Mouth: Mucous membranes are moist.   Eyes:      General: No scleral icterus. Extraocular Movements: Extraocular movements intact. Pupils: Pupils are equal, round, and reactive to light. Cardiovascular:      Rate and Rhythm: Normal rate and regular rhythm. Pulses: Normal pulses. Heart sounds: No murmur heard. No friction rub. No gallop. Pulmonary:      Effort: Pulmonary effort is normal. No respiratory distress. Breath sounds: Normal breath sounds. No stridor. No wheezing. Abdominal:      General: Bowel sounds are normal. There is no distension. Tenderness: There is no abdominal tenderness. There is no right CVA tenderness or left CVA tenderness. Musculoskeletal:         General: No swelling or tenderness. Normal range of motion. Cervical back: Normal range of motion. No rigidity. Right lower leg: No edema. Left lower leg: No edema. Comments: OA changes to distal metacarpals L hand, most prominent at #3   Skin:     General: Skin is warm and dry. Coloration: Skin is not pale.    Neurological: General: No focal deficit present. Mental Status: She is alert and oriented to person, place, and time. Mental status is at baseline. Cranial Nerves: No cranial nerve deficit. Deep Tendon Reflexes: Reflexes normal.   Psychiatric:         Mood and Affect: Mood normal.         Behavior: Behavior normal.         Thought Content: Thought content normal.         Judgment: Judgment normal.                  An electronic signature was used to authenticate this note.     --Az Johnson MD

## 2022-06-03 LAB
BACTERIA SPEC CULT: ABNORMAL
BACTERIA SPEC CULT: ABNORMAL
SERVICE CMNT-IMP: ABNORMAL

## 2022-06-03 RX ORDER — CEPHALEXIN 500 MG/1
500 CAPSULE ORAL 3 TIMES DAILY
Qty: 15 CAPSULE | Refills: 0 | Status: SHIPPED | OUTPATIENT
Start: 2022-06-03 | End: 2022-06-08

## 2022-06-29 ENCOUNTER — OFFICE VISIT (OUTPATIENT)
Dept: FAMILY MEDICINE CLINIC | Facility: CLINIC | Age: 72
End: 2022-06-29
Payer: MEDICARE

## 2022-06-29 VITALS
BODY MASS INDEX: 28.93 KG/M2 | OXYGEN SATURATION: 97 % | SYSTOLIC BLOOD PRESSURE: 128 MMHG | DIASTOLIC BLOOD PRESSURE: 61 MMHG | TEMPERATURE: 98.1 F | HEIGHT: 66 IN | RESPIRATION RATE: 18 BRPM | WEIGHT: 180 LBS | HEART RATE: 47 BPM

## 2022-06-29 DIAGNOSIS — N30.90 CYSTITIS: Primary | ICD-10-CM

## 2022-06-29 LAB
BILIRUBIN, URINE, POC: NEGATIVE
BLOOD URINE, POC: NEGATIVE
GLUCOSE URINE, POC: NEGATIVE
KETONES, URINE, POC: NEGATIVE
LEUKOCYTE ESTERASE, URINE, POC: ABNORMAL
NITRITE, URINE, POC: NEGATIVE
PH, URINE, POC: 6 (ref 4.6–8)
PROTEIN,URINE, POC: ABNORMAL
SPECIFIC GRAVITY, URINE, POC: 1.03 (ref 1–1.03)
URINALYSIS CLARITY, POC: CLEAR
URINALYSIS COLOR, POC: YELLOW
UROBILINOGEN, POC: ABNORMAL

## 2022-06-29 PROCEDURE — 1123F ACP DISCUSS/DSCN MKR DOCD: CPT | Performed by: FAMILY MEDICINE

## 2022-06-29 PROCEDURE — 81002 URINALYSIS NONAUTO W/O SCOPE: CPT | Performed by: FAMILY MEDICINE

## 2022-06-29 PROCEDURE — 99213 OFFICE O/P EST LOW 20 MIN: CPT | Performed by: FAMILY MEDICINE

## 2022-06-29 RX ORDER — NITROFURANTOIN 25; 75 MG/1; MG/1
100 CAPSULE ORAL 2 TIMES DAILY
Qty: 14 CAPSULE | Refills: 0 | Status: SHIPPED | OUTPATIENT
Start: 2022-06-29 | End: 2022-07-07 | Stop reason: ALTCHOICE

## 2022-06-29 ASSESSMENT — PATIENT HEALTH QUESTIONNAIRE - PHQ9
SUM OF ALL RESPONSES TO PHQ9 QUESTIONS 1 & 2: 0
2. FEELING DOWN, DEPRESSED OR HOPELESS: 0
SUM OF ALL RESPONSES TO PHQ QUESTIONS 1-9: 0
1. LITTLE INTEREST OR PLEASURE IN DOING THINGS: 0
SUM OF ALL RESPONSES TO PHQ QUESTIONS 1-9: 0

## 2022-06-29 ASSESSMENT — ENCOUNTER SYMPTOMS
BLOOD IN STOOL: 0
ABDOMINAL PAIN: 0
SHORTNESS OF BREATH: 0
CHEST TIGHTNESS: 0

## 2022-06-29 NOTE — PROGRESS NOTES
Ag[facility]  _______________________________________  MD Mamta Luke, DO Leonel Camilo, MD Simon Ventura MD    [address] [facility], [address] [place] [address]  Phone: [phone]  Fax: [phone]    Yari Vaughn (:  ) is a 66 y.o. female,Established patient, here for evaluation of the following chief complaint(s):  Pain (in the Bladder are. Had an infection in the past with this.)         ASSESSMENT/PLAN:      1. Cystitis  Will empirically treat with macrobid and send urine cx. Call with worsening sx. She has hx of bladder tack, no uterus. If she gets a rd UTI before the winter I am sending her to uroGYN.   - AMB POC URINALYSIS DIP STICK MANUAL W/O MICRO  - Culture, Urine; Future  - nitrofurantoin, macrocrystal-monohydrate, (MACROBID) 100 MG capsule; Take 1 capsule by mouth 2 times daily for 10 days  Dispense: 14 capsule; Refill: 0    FU PRN    Subjective   SUBJECTIVE/OBJECTIVE:    Pt is an add on for possible UTI:    She states that her symptoms are mostly lower abdominal pain in the suprapubic area. . Pain woke her up last night. No becky dysuria. She has not seen blood, no F/C, no flank pain. Treated for cx positive GBS a month ago with keflex. UA notable for 1+ LE, Trace protein, NO Blood, SG 1030    Review of Systems   Constitutional: Negative for chills and fever. Respiratory: Negative for chest tightness and shortness of breath. Cardiovascular: Negative for chest pain. Gastrointestinal: Negative for abdominal pain and blood in stool. Genitourinary: Positive for dysuria and pelvic pain. Negative for hematuria. Neurological: Negative for syncope. Objective   Physical Exam  Vitals and nursing note reviewed. Constitutional:       Appearance: Normal appearance. HENT:      Head: Normocephalic and atraumatic.       Right Ear: External ear normal.      Left Ear: External ear normal.      Mouth/Throat: Mouth: Mucous membranes are moist.   Eyes:      General: No scleral icterus. Extraocular Movements: Extraocular movements intact. Pupils: Pupils are equal, round, and reactive to light. Cardiovascular:      Rate and Rhythm: Normal rate and regular rhythm. Pulses: Normal pulses. Heart sounds: No murmur heard. No friction rub. No gallop. Pulmonary:      Effort: Pulmonary effort is normal. No respiratory distress. Breath sounds: Normal breath sounds. No stridor. No wheezing. Abdominal:      General: Bowel sounds are normal. There is no distension. Tenderness: There is no abdominal tenderness. There is no right CVA tenderness or left CVA tenderness. Musculoskeletal:         General: No swelling or tenderness. Normal range of motion. Cervical back: Normal range of motion. No rigidity. Right lower leg: No edema. Left lower leg: No edema. Skin:     General: Skin is warm and dry. Coloration: Skin is not pale. Neurological:      General: No focal deficit present. Mental Status: She is alert and oriented to person, place, and time. Mental status is at baseline. Cranial Nerves: No cranial nerve deficit. Deep Tendon Reflexes: Reflexes normal.   Psychiatric:         Mood and Affect: Mood normal.         Behavior: Behavior normal.         Thought Content: Thought content normal.         Judgment: Judgment normal.                  An electronic signature was used to authenticate this note.     --Keisha Miller MD

## 2022-06-30 ENCOUNTER — TELEPHONE (OUTPATIENT)
Dept: FAMILY MEDICINE CLINIC | Facility: CLINIC | Age: 72
End: 2022-06-30

## 2022-06-30 RX ORDER — CIPROFLOXACIN 500 MG/1
500 TABLET, FILM COATED ORAL 2 TIMES DAILY
Qty: 10 TABLET | Refills: 0 | Status: SHIPPED | OUTPATIENT
Start: 2022-06-30 | End: 2022-07-05

## 2022-06-30 NOTE — TELEPHONE ENCOUNTER
Patient called stating Chi Janneth was too expensive. Asked If a different antibiotic could be sent in.

## 2022-06-30 NOTE — TELEPHONE ENCOUNTER
I have never heard anyone say that drug cost too much before. It's always been cheap until now. I called in cipro to cover her until we get her culture results back.

## 2022-07-07 ENCOUNTER — TELEPHONE (OUTPATIENT)
Dept: FAMILY MEDICINE CLINIC | Facility: CLINIC | Age: 72
End: 2022-07-07

## 2022-07-07 DIAGNOSIS — N30.90 CYSTITIS: Primary | ICD-10-CM

## 2022-07-07 RX ORDER — CEPHALEXIN 500 MG/1
500 CAPSULE ORAL 3 TIMES DAILY
Qty: 15 CAPSULE | Refills: 0 | Status: SHIPPED | OUTPATIENT
Start: 2022-07-07 | End: 2022-10-06 | Stop reason: ALTCHOICE

## 2022-07-07 NOTE — TELEPHONE ENCOUNTER
I'll start her on keflex. She should come give us another urine sample for a repeat culture so we can stop chasing our tails. I am forwarding this to our manager because I'm getting back about 25% of the cultures I order. It's not good.

## 2022-07-07 NOTE — TELEPHONE ENCOUNTER
Finished Cipro on 7/5/22 started having pain with urination again today. Looks like urine culture was not collected on 6/29.

## 2022-07-08 DIAGNOSIS — N30.90 CYSTITIS: ICD-10-CM

## 2022-07-11 LAB
BACTERIA SPEC CULT: NORMAL
SERVICE CMNT-IMP: NORMAL

## 2022-07-11 NOTE — RESULT ENCOUNTER NOTE
Patient notified of results. She is not having any discomfort at this time.  She does not have GYN but would like to wait for a referral.

## 2022-07-13 ENCOUNTER — TELEPHONE (OUTPATIENT)
Dept: FAMILY MEDICINE CLINIC | Facility: CLINIC | Age: 72
End: 2022-07-13

## 2022-07-13 DIAGNOSIS — R10.2 PELVIC PAIN: Primary | ICD-10-CM

## 2022-08-08 ENCOUNTER — OFFICE VISIT (OUTPATIENT)
Dept: OBGYN CLINIC | Age: 72
End: 2022-08-08
Payer: MEDICARE

## 2022-08-08 VITALS
HEIGHT: 65 IN | BODY MASS INDEX: 29.66 KG/M2 | WEIGHT: 178 LBS | DIASTOLIC BLOOD PRESSURE: 82 MMHG | SYSTOLIC BLOOD PRESSURE: 126 MMHG

## 2022-08-08 DIAGNOSIS — R10.2 PELVIC PAIN: Primary | ICD-10-CM

## 2022-08-08 PROCEDURE — 1123F ACP DISCUSS/DSCN MKR DOCD: CPT | Performed by: OBSTETRICS & GYNECOLOGY

## 2022-08-08 PROCEDURE — 99203 OFFICE O/P NEW LOW 30 MIN: CPT | Performed by: OBSTETRICS & GYNECOLOGY

## 2022-08-08 RX ORDER — ESTRADIOL 0.1 MG/G
CREAM VAGINAL
Qty: 42.5 G | Refills: 3 | Status: SHIPPED | OUTPATIENT
Start: 2022-08-08

## 2022-08-08 RX ORDER — UBIDECARENONE 75 MG
50 CAPSULE ORAL DAILY
COMMUNITY
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
## 2022-08-18 RX ORDER — HYDROCHLOROTHIAZIDE 12.5 MG/1
12.5 TABLET ORAL DAILY
Qty: 90 TABLET | Refills: 1 | Status: SHIPPED | OUTPATIENT
Start: 2022-08-18

## 2022-09-23 DIAGNOSIS — I10 ESSENTIAL (PRIMARY) HYPERTENSION: Primary | ICD-10-CM

## 2022-09-23 RX ORDER — LOSARTAN POTASSIUM 100 MG/1
TABLET ORAL
Qty: 90 TABLET | Refills: 1 | Status: SHIPPED | OUTPATIENT
Start: 2022-09-23

## 2022-10-05 ASSESSMENT — ENCOUNTER SYMPTOMS: SHORTNESS OF BREATH: 0

## 2022-10-05 NOTE — PROGRESS NOTES
[address], [address] [place] [address]  PHONE: [phone]    Leidy Hernandez  1950      SUBJECTIVE:   Leidy Hernandez is a 67 y.o. female seen for a follow up visit regarding the following:     Chief Complaint   Patient presents with    Annual Exam    Hypertension    Atrial Fibrillation       HPI:    Patient presents for follow-up. Last visit December 9, 2021. Multiple issues addressed. Paroxysmal atrial fibrillation:  No recent events. No neurologic issues. Has [employer] watch which monitors for afib. All recorded strips are sinus rhythm. HTN:  BP controlled. 120/72 at home. Taking Lopressor and Losartan. She would like to switch to Toprol. Past Medical History, Past Surgical History, Family history, Social History, and Medications were all reviewed with the patient today and updated as necessary. Current Outpatient Medications:     carbidopa-levodopa (SINEMET)  MG per tablet, TAKE 1/2 (ONE-HALF) TABLET BY MOUTH THREE TIMES DAILY, Disp: , Rfl:     metoprolol succinate (TOPROL XL) 100 MG extended release tablet, Take 1 tablet by mouth daily, Disp: 30 tablet, Rfl: 3    losartan (COZAAR) 100 MG tablet, TABLET BY MOUTH EVERY DAY, Disp: 90 tablet, Rfl: 1    hydroCHLOROthiazide (HYDRODIURIL) 12.5 MG tablet, Take 1 tablet by mouth daily Take 1 tablet by mouth once daily, Disp: 90 tablet, Rfl: 1    vitamin B-12 (CYANOCOBALAMIN) 100 MCG tablet, Take 50 mcg by mouth in the morning., Disp: , Rfl:     estradiol (ESTRACE VAGINAL) 0.1 MG/GM vaginal cream, 0.5g applied vaginally nightly for 2 weeks then twice weekly. , Disp: 42.5 g, Rfl: 3    acetaminophen (TYLENOL) 325 MG tablet, Take 325 mg by mouth every 4 hours as needed, Disp: , Rfl:     aspirin 81 MG EC tablet, Take 81 mg by mouth daily, Disp: , Rfl:     Biotin 2.5 MG CAPS, Take 5,000 mg by mouth, Disp: , Rfl:     vitamin D3 (CHOLECALCIFEROL) 125 MCG (5000 UT) TABS tablet, Take by mouth daily, Disp: , Rfl:     eszopiclone (LUNESTA) 2 MG TABS, Take 2 mg by mouth., Disp: , Rfl:     mirtazapine (REMERON) 15 MG tablet, Take 15 mg by mouth, Disp: , Rfl:     primidone (MYSOLINE) 50 MG tablet, Take 1/2 in the am 1/2 in afternoon and 1 tab at bedtime, Disp: , Rfl:     rOPINIRole (REQUIP) 1 MG tablet, 1 mg 3 times daily, Disp: , Rfl:     vitamin E 400 UNIT capsule, Take by mouth daily, Disp: , Rfl:     clobetasol (TEMOVATE) 0.05 % cream, Apply topically 2 times daily (Patient not taking: Reported on 10/6/2022), Disp: , Rfl:     melatonin 5 MG TABS tablet, Take 10 mg by mouth, Disp: , Rfl:      Allergies   Allergen Reactions    Adhesive Tape Other (See Comments)     Irritates  the skin or pulls the skin off    Naproxen Other (See Comments)     \"reflux\"        Patient Active Problem List    Diagnosis Date Noted    Cystitis 06/29/2022     Priority: Medium    Bladder spasm 06/01/2022     Priority: Medium    Hand arthritis 06/01/2022     Priority: Medium    Bilateral hip joint arthritis 05/03/2022     Priority: Low    Onychomycosis 05/03/2022     Priority: Low    Psychophysiologic insomnia 02/02/2022     Priority: Low    Memory difficulty 02/02/2022     Priority: Low    Closed fracture of left distal radius 05/18/2021     Priority: Low    Wrist fracture, right, closed, initial encounter 05/12/2021     Priority: Low    Chronic fatigue 05/28/2020     Priority: Low    Neoplasm, uncertain whether benign or malignant 02/24/2020     Priority: Low    Cellulitis of right lower extremity 01/27/2020     Priority: Low    Essential tremor 01/27/2020     Priority: Low    Left hip pain 01/27/2020     Priority: Low    Fissure in skin of foot 01/27/2020     Priority: Low    Rectocele 07/08/2016     Priority: Low    Cystocele, lateral 07/08/2016     Priority: Low    Uterovaginal prolapse, incomplete 07/08/2016     Priority: Low    Lupus (systemic lupus erythematosus) ([facility] 01/21/2016     Priority: Low    PAF (paroxysmal atrial fibrillation) ([facility] 09/21/2015     Priority: Low     1. Seen in ER 8/25/15:  Atrial fibrillation which converted to sinus   rhythm in ER. 2.  Echo (9/15):  EF 58%. Impaired relaxation. Mild tricuspid   regurgitation. 3.  Failed flecainide and amiodarone therapy. 3.  Atrial fibrillation ablation (10/15)  a. Coronary CTA:  No CAD. Anomalous circ of RCA. 4.  Seen by Dr. Sanchez Pulse 1/16:  Amio and anticoagulation stopped. Started   on ASA. Essential hypertension 09/21/2015     Priority: Low    Anxiety disorder 09/21/2015     Priority: Low        Social History     Tobacco Use    Smoking status: Never    Smokeless tobacco: Never   Substance Use Topics    Alcohol use: Yes     Comment: social       ROS:    Review of Systems   Constitutional: Negative for malaise/fatigue. Cardiovascular:  Negative for chest pain. Respiratory:  Negative for shortness of breath. Neurological:  Negative for focal weakness. Psychiatric/Behavioral:  Negative for depression. PHYSICAL EXAM:  Wt Readings from Last 3 Encounters:   10/06/22 178 lb (80.7 kg)   08/08/22 178 lb (80.7 kg)   06/29/22 180 lb (81.6 kg)     BP Readings from Last 3 Encounters:   10/06/22 (!) 138/90   08/08/22 126/82   06/29/22 128/61     Pulse Readings from Last 3 Encounters:   10/06/22 51   06/29/22 (!) 47   06/01/22 58       Physical Exam  Constitutional:       General: She is not in acute distress. Appearance: Normal appearance. Neck:      Vascular: No carotid bruit. Cardiovascular:      Rate and Rhythm: Normal rate and regular rhythm. Pulmonary:      Breath sounds: Normal breath sounds. No wheezing. Abdominal:      General: There is no distension. Palpations: Abdomen is soft. Musculoskeletal:         General: No swelling. Skin:     General: Skin is warm and dry. Neurological:      General: No focal deficit present.    Psychiatric:         Mood and Affect: Mood normal.       Medical problems and test results were reviewed with the patient today. Lab Results   Component Value Date    WBC 6.2 05/03/2022    HGB 13.9 05/03/2022    HCT 41.6 05/03/2022    MCV 95 05/03/2022     05/03/2022       Lab Results   Component Value Date/Time     05/03/2022 10:23 AM    K 4.5 05/03/2022 10:23 AM     05/03/2022 10:23 AM    CO2 23 05/03/2022 10:23 AM    BUN 14 05/03/2022 10:23 AM    CREATININE 0.75 05/03/2022 10:23 AM    GLUCOSE 122 05/03/2022 10:23 AM    CALCIUM 9.5 05/03/2022 10:23 AM        No results found for: CHOL  No results found for: TRIG  No results found for: HDL  No results found for: LDLCHOLESTEROL, LDLCALC  No results found for: LABVLDL, VLDL  No results found for: CHOLHDLRATIO     Data from outside records/labs from outside providers have been reviewed and summarized as noted in the HPI, past history and data review sections of this note     EKG done today and reviewed with patient showed:  Sinus  Bradycardia   -Incomplete right bundle branch block. Rate 51    ASSESSMENT and PLAN      1. Essential hypertension  Blood pressure controlled. Change metoprolol to tartrate to metoprolol succinate 100 mg daily at her request.  - EKG 12 Lead    2. PAF (paroxysmal atrial fibrillation) (HCC)  Sinus rhythm. Anticoagulation was stopped by electrophysiology. Continue to monitor for recurrence with her watch. If recurrent atrial fibrillation would need to start anticoagulation. Continue Toprol. - EKG 12 Lead                 Presley Armas MD  10/6/2022  11:08 AM    This note may have been dictated using speech recognition software.   As a result, error of speech recognition may have occurred

## 2022-10-06 ENCOUNTER — OFFICE VISIT (OUTPATIENT)
Dept: CARDIOLOGY CLINIC | Age: 72
End: 2022-10-06
Payer: MEDICARE

## 2022-10-06 VITALS
HEART RATE: 51 BPM | HEIGHT: 65 IN | BODY MASS INDEX: 29.66 KG/M2 | DIASTOLIC BLOOD PRESSURE: 90 MMHG | WEIGHT: 178 LBS | SYSTOLIC BLOOD PRESSURE: 138 MMHG

## 2022-10-06 DIAGNOSIS — I48.0 PAF (PAROXYSMAL ATRIAL FIBRILLATION) (HCC): ICD-10-CM

## 2022-10-06 DIAGNOSIS — I10 ESSENTIAL HYPERTENSION: Primary | ICD-10-CM

## 2022-10-06 PROCEDURE — 99213 OFFICE O/P EST LOW 20 MIN: CPT | Performed by: INTERNAL MEDICINE

## 2022-10-06 PROCEDURE — 1123F ACP DISCUSS/DSCN MKR DOCD: CPT | Performed by: INTERNAL MEDICINE

## 2022-10-06 PROCEDURE — 93000 ELECTROCARDIOGRAM COMPLETE: CPT | Performed by: INTERNAL MEDICINE

## 2022-10-06 RX ORDER — METOPROLOL SUCCINATE 100 MG/1
100 TABLET, EXTENDED RELEASE ORAL DAILY
Qty: 30 TABLET | Refills: 3 | Status: SHIPPED | OUTPATIENT
Start: 2022-10-06 | End: 2022-10-10 | Stop reason: SDUPTHER

## 2022-10-10 ENCOUNTER — OFFICE VISIT (OUTPATIENT)
Dept: FAMILY MEDICINE CLINIC | Facility: CLINIC | Age: 72
End: 2022-10-10
Payer: MEDICARE

## 2022-10-10 VITALS
BODY MASS INDEX: 28.61 KG/M2 | WEIGHT: 178 LBS | DIASTOLIC BLOOD PRESSURE: 83 MMHG | HEIGHT: 66 IN | SYSTOLIC BLOOD PRESSURE: 130 MMHG

## 2022-10-10 DIAGNOSIS — I10 ESSENTIAL (PRIMARY) HYPERTENSION: Primary | ICD-10-CM

## 2022-10-10 DIAGNOSIS — E55.9 VITAMIN D DEFICIENCY, UNSPECIFIED: ICD-10-CM

## 2022-10-10 DIAGNOSIS — F51.04 PSYCHOPHYSIOLOGIC INSOMNIA: ICD-10-CM

## 2022-10-10 DIAGNOSIS — E78.00 PURE HYPERCHOLESTEROLEMIA: Primary | ICD-10-CM

## 2022-10-10 DIAGNOSIS — G25.0 ESSENTIAL TREMOR: ICD-10-CM

## 2022-10-10 DIAGNOSIS — N30.90 CYSTITIS: ICD-10-CM

## 2022-10-10 DIAGNOSIS — I10 ESSENTIAL (PRIMARY) HYPERTENSION: ICD-10-CM

## 2022-10-10 PROCEDURE — 99214 OFFICE O/P EST MOD 30 MIN: CPT | Performed by: FAMILY MEDICINE

## 2022-10-10 PROCEDURE — 1123F ACP DISCUSS/DSCN MKR DOCD: CPT | Performed by: FAMILY MEDICINE

## 2022-10-10 RX ORDER — ROPINIROLE 1 MG/1
1 TABLET, FILM COATED ORAL 3 TIMES DAILY
Qty: 270 TABLET | Refills: 1 | Status: SHIPPED | OUTPATIENT
Start: 2022-10-10 | End: 2022-11-03 | Stop reason: SDUPTHER

## 2022-10-10 RX ORDER — LOSARTAN POTASSIUM AND HYDROCHLOROTHIAZIDE 12.5; 1 MG/1; MG/1
1 TABLET ORAL DAILY
Qty: 90 TABLET | Refills: 1 | Status: SHIPPED | OUTPATIENT
Start: 2022-10-10 | End: 2022-11-03 | Stop reason: SDUPTHER

## 2022-10-10 RX ORDER — METOPROLOL SUCCINATE 100 MG/1
100 TABLET, EXTENDED RELEASE ORAL DAILY
Qty: 90 TABLET | Refills: 1 | Status: SHIPPED | OUTPATIENT
Start: 2022-10-10 | End: 2022-11-03 | Stop reason: SDUPTHER

## 2022-10-10 RX ORDER — PRIMIDONE 50 MG/1
150 TABLET ORAL 2 TIMES DAILY
Qty: 180 TABLET | Refills: 1 | Status: SHIPPED | OUTPATIENT
Start: 2022-10-10 | End: 2022-10-17 | Stop reason: SDUPTHER

## 2022-10-10 RX ORDER — MIRTAZAPINE 15 MG/1
7.5 TABLET, FILM COATED ORAL NIGHTLY
Qty: 45 TABLET | Refills: 1 | Status: SHIPPED | OUTPATIENT
Start: 2022-10-10 | End: 2022-11-03 | Stop reason: SDUPTHER

## 2022-10-10 ASSESSMENT — PATIENT HEALTH QUESTIONNAIRE - PHQ9
SUM OF ALL RESPONSES TO PHQ QUESTIONS 1-9: 0
1. LITTLE INTEREST OR PLEASURE IN DOING THINGS: 0
SUM OF ALL RESPONSES TO PHQ QUESTIONS 1-9: 0
2. FEELING DOWN, DEPRESSED OR HOPELESS: 0
SUM OF ALL RESPONSES TO PHQ9 QUESTIONS 1 & 2: 0

## 2022-10-10 ASSESSMENT — ENCOUNTER SYMPTOMS
ABDOMINAL PAIN: 0
BLOOD IN STOOL: 0
CHEST TIGHTNESS: 0
SHORTNESS OF BREATH: 0

## 2022-10-10 NOTE — PROGRESS NOTES
[place]  _______________________________________  MD Aleksandra Chen, DO  Sung Specking, NP Honey Koyanagi, MD Carlus Overly, [address]  Phone: [phone]  Fax: [phone]    Dion Winters (:  ) is a 67 y.o. female,Established patient, here for evaluation of the following chief complaint(s):  Hypertension (Would like rx sent as a combo for losartan/hctz), Insomnia, and Labs Only (Would like lipids done today )         ASSESSMENT/PLAN:    1. Essential (primary) hypertension  Stable. Continue current regimen, check renal function. - losartan-hydroCHLOROthiazide (HYZAAR) 100-12.5 MG per tablet; Take 1 tablet by mouth daily  Dispense: 90 tablet; Refill: 1  - metoprolol succinate (TOPROL XL) 100 MG extended release tablet; Take 1 tablet by mouth daily  Dispense: 90 tablet; Refill: 1  - Comprehensive Metabolic Panel; Future  - Lipid Panel; Future    2. Essential tremor  Stable, continue current regimen. - carbidopa-levodopa (SINEMET)  MG per tablet; Take 0.5 tablets by mouth 3 times daily  Dispense: 135 tablet; Refill: 1  - primidone (MYSOLINE) 50 MG tablet; Take 3 tablets by mouth in the morning and at bedtime Take 1/2 in the am 1/2 in afternoon and 1 tab at bedtime  Dispense: 180 tablet; Refill: 1  - rOPINIRole (REQUIP) 1 MG tablet; Take 1 tablet by mouth 3 times daily  Dispense: 270 tablet; Refill: 1    3. Vitamin D deficiency, unspecified  Stable, continue current regimen. Check levels. - Vitamin D 25 Hydroxy; Future    4. Psychophysiologic insomnia  Stable, continue current regimen. - mirtazapine (REMERON) 15 MG tablet; Take 0.5 tablets by mouth nightly  Dispense: 45 tablet; Refill: 1    She had flu and COVID shots already. Subjective   SUBJECTIVE/OBJECTIVE:        HTN/Afib: She is on chronic losartan 100, HCTZ 12.5, Lopressor 50, and ASA 81.  Saw Cardiology recently, there had been no documented recurrence of Afib, EPS took her off her NOAC. She follows with Dr. Mary Baxter. Saw him last week, no further changes made. BP Readings from Last 3 Encounters:   10/06/22 (!) 138/90   22 126/82   22 128/61     Lab Results   Component Value Date/Time     2022 10:23 AM    K 4.5 2022 10:23 AM     2022 10:23 AM    CO2 23 2022 10:23 AM    BUN 14 2022 10:23 AM    CREATININE 0.75 2022 10:23 AM    GLUCOSE 122 2022 10:23 AM    CALCIUM 9.5 2022 10:23 AM        Lab Results   Component Value Date    TSH 0.867 2022          Anxiety: Had been worse after her   of COVID fall . She is on melatonin to help with sleep and PRN klonopin 1mg. Has essential tremor managed by neuro, the Bz has helped subtly with this as well. States she was on an SSRI during menopause and that flattened her out completely. She has been doing 1/2 klonopin a night recently. She states she is worried about her memory, but passes a minicog easily. We tried her on lunesta 3m ago to help consolidate sleep to try to get her off the Bz she started after her 's death. This as not covered so we started her on Ambien 5. She stopped taking this as well, no longer taking Zoloft or sleep aid aside from Remeron 15 only at this point. Not gaining weight. Wt Readings from Last 3 Encounters:   10/10/22 178 lb (80.7 kg)   10/06/22 178 lb (80.7 kg)   22 178 lb (80.7 kg)         Review of Systems   Constitutional:  Negative for chills and fever. Respiratory:  Negative for chest tightness and shortness of breath. Cardiovascular:  Negative for chest pain. Gastrointestinal:  Negative for abdominal pain and blood in stool. Genitourinary:  Negative for hematuria. Neurological:  Negative for syncope. Psychiatric/Behavioral:  Negative for dysphoric mood, sleep disturbance and suicidal ideas. The patient is not nervous/anxious.          Objective Physical Exam  Vitals and nursing note reviewed. Constitutional:       Appearance: Normal appearance. HENT:      Head: Normocephalic and atraumatic. Right Ear: External ear normal.      Left Ear: External ear normal.      Mouth/Throat:      Mouth: Mucous membranes are moist.   Eyes:      General: No scleral icterus. Extraocular Movements: Extraocular movements intact. Pupils: Pupils are equal, round, and reactive to light. Cardiovascular:      Rate and Rhythm: Normal rate and regular rhythm. Pulses: Normal pulses. Heart sounds: No murmur heard. No friction rub. No gallop. Pulmonary:      Effort: Pulmonary effort is normal. No respiratory distress. Breath sounds: Normal breath sounds. No stridor. No wheezing. Abdominal:      General: Bowel sounds are normal. There is no distension. Tenderness: There is no abdominal tenderness. There is no right CVA tenderness or left CVA tenderness. Musculoskeletal:         General: No swelling or tenderness. Normal range of motion. Cervical back: Normal range of motion. No rigidity. Right lower leg: No edema. Left lower leg: No edema. Skin:     General: Skin is warm and dry. Coloration: Skin is not pale. Neurological:      General: No focal deficit present. Mental Status: She is alert and oriented to person, place, and time. Mental status is at baseline. Cranial Nerves: No cranial nerve deficit. Deep Tendon Reflexes: Reflexes normal.   Psychiatric:         Mood and Affect: Mood normal.         Behavior: Behavior normal.         Thought Content: Thought content normal.         Judgment: Judgment normal.                An electronic signature was used to authenticate this note.     --Collin Zheng MD

## 2022-10-11 PROBLEM — E78.00 PURE HYPERCHOLESTEROLEMIA: Status: ACTIVE | Noted: 2022-10-11

## 2022-10-11 LAB
25(OH)D3 SERPL-MCNC: 61.9 NG/ML (ref 30–100)
ALBUMIN SERPL-MCNC: 3.9 G/DL (ref 3.2–4.6)
ALBUMIN/GLOB SERPL: 1.3 {RATIO} (ref 0.4–1.6)
ALP SERPL-CCNC: 78 U/L (ref 50–136)
ALT SERPL-CCNC: 20 U/L (ref 12–65)
ANION GAP SERPL CALC-SCNC: 4 MMOL/L (ref 2–11)
AST SERPL-CCNC: 19 U/L (ref 15–37)
BILIRUB SERPL-MCNC: 0.4 MG/DL (ref 0.2–1.1)
BUN SERPL-MCNC: 13 MG/DL (ref 8–23)
CALCIUM SERPL-MCNC: 9.1 MG/DL (ref 8.3–10.4)
CHLORIDE SERPL-SCNC: 107 MMOL/L (ref 101–110)
CHOLEST SERPL-MCNC: 216 MG/DL
CO2 SERPL-SCNC: 28 MMOL/L (ref 21–32)
CREAT SERPL-MCNC: 0.7 MG/DL (ref 0.6–1)
GLOBULIN SER CALC-MCNC: 2.9 G/DL (ref 2.8–4.5)
GLUCOSE SERPL-MCNC: 108 MG/DL (ref 65–100)
HDLC SERPL-MCNC: 67 MG/DL (ref 40–60)
HDLC SERPL: 3.2 {RATIO}
LDLC SERPL CALC-MCNC: 128.4 MG/DL
POTASSIUM SERPL-SCNC: 4.1 MMOL/L (ref 3.5–5.1)
PROT SERPL-MCNC: 6.8 G/DL (ref 6.3–8.2)
SODIUM SERPL-SCNC: 139 MMOL/L (ref 133–143)
TRIGL SERPL-MCNC: 103 MG/DL (ref 35–150)
VLDLC SERPL CALC-MCNC: 20.6 MG/DL (ref 6–23)

## 2022-10-12 ENCOUNTER — OFFICE VISIT (OUTPATIENT)
Dept: OBGYN CLINIC | Age: 72
End: 2022-10-12
Payer: MEDICARE

## 2022-10-12 VITALS
DIASTOLIC BLOOD PRESSURE: 82 MMHG | SYSTOLIC BLOOD PRESSURE: 120 MMHG | WEIGHT: 178 LBS | BODY MASS INDEX: 28.61 KG/M2 | HEIGHT: 66 IN

## 2022-10-12 DIAGNOSIS — N95.2 VAGINAL ATROPHY: Primary | ICD-10-CM

## 2022-10-12 PROCEDURE — 1123F ACP DISCUSS/DSCN MKR DOCD: CPT | Performed by: OBSTETRICS & GYNECOLOGY

## 2022-10-12 PROCEDURE — 99213 OFFICE O/P EST LOW 20 MIN: CPT | Performed by: OBSTETRICS & GYNECOLOGY

## 2022-10-12 RX ORDER — ESTRADIOL 0.1 MG/G
CREAM VAGINAL
Qty: 42.5 G | Refills: 3 | Status: SHIPPED | OUTPATIENT
Start: 2022-10-12
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
## 2022-10-13 RX ORDER — ROSUVASTATIN CALCIUM 10 MG/1
10 TABLET, COATED ORAL DAILY
Qty: 90 TABLET | Refills: 1 | Status: SHIPPED | OUTPATIENT
Start: 2022-10-13 | End: 2022-11-03 | Stop reason: SDUPTHER

## 2022-10-17 ENCOUNTER — PATIENT MESSAGE (OUTPATIENT)
Dept: FAMILY MEDICINE CLINIC | Facility: CLINIC | Age: 72
End: 2022-10-17

## 2022-10-17 DIAGNOSIS — G25.0 ESSENTIAL TREMOR: ICD-10-CM

## 2022-10-17 RX ORDER — PRIMIDONE 50 MG/1
75 TABLET ORAL 2 TIMES DAILY
Qty: 270 TABLET | Refills: 1 | Status: SHIPPED | OUTPATIENT
Start: 2022-10-17 | End: 2022-11-03 | Stop reason: SDUPTHER

## 2022-10-17 NOTE — TELEPHONE ENCOUNTER
From: Leidy Hernandez  To: Dr. Kenyetta Boo: 10/17/2022 11:52 AM EDT  Subject: Primidone Prescription     [facility] pharmacy on [facility] called to tell me there was a problem with the dosing information on my primidone prescription the directions that were sent said to take three tablets in the morning and a half a tablet three times a day the correct dosing information would be to take 1 and 1/2 tablets twice a day  Also I would like a 90-day prescription which would be 270 tablets  They need this information corrected before they can refill it

## 2022-11-03 ENCOUNTER — PATIENT MESSAGE (OUTPATIENT)
Dept: FAMILY MEDICINE CLINIC | Facility: CLINIC | Age: 72
End: 2022-11-03

## 2022-11-03 DIAGNOSIS — E78.00 PURE HYPERCHOLESTEROLEMIA: ICD-10-CM

## 2022-11-03 DIAGNOSIS — F51.04 PSYCHOPHYSIOLOGIC INSOMNIA: ICD-10-CM

## 2022-11-03 DIAGNOSIS — I10 ESSENTIAL (PRIMARY) HYPERTENSION: ICD-10-CM

## 2022-11-03 DIAGNOSIS — G25.0 ESSENTIAL TREMOR: ICD-10-CM

## 2022-11-03 RX ORDER — MIRTAZAPINE 15 MG/1
7.5 TABLET, FILM COATED ORAL NIGHTLY
Qty: 45 TABLET | Refills: 0 | Status: SHIPPED | OUTPATIENT
Start: 2022-11-03

## 2022-11-03 RX ORDER — METOPROLOL SUCCINATE 100 MG/1
100 TABLET, EXTENDED RELEASE ORAL DAILY
Qty: 90 TABLET | Refills: 0 | Status: SHIPPED | OUTPATIENT
Start: 2022-11-03

## 2022-11-03 RX ORDER — ROPINIROLE 1 MG/1
1 TABLET, FILM COATED ORAL 3 TIMES DAILY
Qty: 270 TABLET | Refills: 0 | Status: SHIPPED | OUTPATIENT
Start: 2022-11-03

## 2022-11-03 RX ORDER — ROSUVASTATIN CALCIUM 10 MG/1
10 TABLET, COATED ORAL DAILY
Qty: 90 TABLET | Refills: 0 | Status: SHIPPED | OUTPATIENT
Start: 2022-11-03

## 2022-11-03 RX ORDER — PRIMIDONE 50 MG/1
75 TABLET ORAL 2 TIMES DAILY
Qty: 270 TABLET | Refills: 0 | Status: SHIPPED | OUTPATIENT
Start: 2022-11-03

## 2022-11-03 RX ORDER — LOSARTAN POTASSIUM AND HYDROCHLOROTHIAZIDE 12.5; 1 MG/1; MG/1
1 TABLET ORAL DAILY
Qty: 90 TABLET | Refills: 0 | Status: SHIPPED | OUTPATIENT
Start: 2022-11-03

## 2022-11-03 NOTE — TELEPHONE ENCOUNTER
From: Lindsey Layton  To: Dr. Kezia Delarosa: 11/3/2022 12:13 PM EDT  Subject: New Prescriptions Needed    I have to change pharmacies and will be using [id number] at [facility] [address] [facility] [place] [phone] and they would prefer receiving new scripts for all my medications. I have attached labels from five of them but also need 90 day scripts for metoprolol  mg 1 tab/day qty 90 and losartan//12.5 mg 1 tab/day qty 90 sent.  The five others are also 90 day scripts and include   rosuvastatin 10 mg/day qty 90  ropinirole 1 mg tabs, 1 and 1/2 tabs 3x/day qty 405  carb/levo 25/100 mg 1/2 tab 3x/day qty 135  primidone 50 mg tab 1 and 1/2 tab 2x/day qty 270  mirtazapine 15 mg 1/2 tab/day qty 39  Thank you so much  Bing Siddiqui

## 2022-12-20 ENCOUNTER — PATIENT MESSAGE (OUTPATIENT)
Dept: FAMILY MEDICINE CLINIC | Facility: CLINIC | Age: 72
End: 2022-12-20

## 2022-12-20 DIAGNOSIS — G25.0 ESSENTIAL TREMOR: ICD-10-CM

## 2022-12-20 NOTE — TELEPHONE ENCOUNTER
From: Marilin Merchant  To: Dr. Lockhart Rued: 12/20/2022 11:10 AM EST  Subject: My Ropinirole Script    The Script you sent in to the [facility] in [address] [place] [address] for my ropinirole was not correct in the number of pills to be filled. Have attached an earlier script and the lastest script labels from Dr. Cedric Salinas. The script I need written is for 405 pills so that I can take 1 & 1/2 pills three times daily. Hate to be a bother, but would you please submit another script to the [facility] store mentioned earlier.   Thank you,  Rusty Arora

## 2022-12-21 RX ORDER — ROPINIROLE 1 MG/1
1.5 TABLET, FILM COATED ORAL 3 TIMES DAILY
Qty: 405 TABLET | Refills: 0 | Status: SHIPPED | OUTPATIENT
Start: 2022-12-21

## 2023-02-04 DIAGNOSIS — I10 ESSENTIAL (PRIMARY) HYPERTENSION: ICD-10-CM

## 2023-02-06 RX ORDER — LOSARTAN POTASSIUM AND HYDROCHLOROTHIAZIDE 12.5; 1 MG/1; MG/1
TABLET ORAL
Qty: 90 TABLET | Refills: 0 | OUTPATIENT
Start: 2023-02-06

## 2023-03-19 DIAGNOSIS — G25.0 ESSENTIAL TREMOR: ICD-10-CM

## 2023-03-20 RX ORDER — ROPINIROLE 1 MG/1
TABLET, FILM COATED ORAL
Qty: 405 TABLET | Refills: 0 | OUTPATIENT
Start: 2023-03-20

## 2023-03-24 DIAGNOSIS — I10 ESSENTIAL (PRIMARY) HYPERTENSION: ICD-10-CM

## 2023-03-24 DIAGNOSIS — G25.0 ESSENTIAL TREMOR: ICD-10-CM

## 2023-03-24 RX ORDER — LOSARTAN POTASSIUM AND HYDROCHLOROTHIAZIDE 12.5; 1 MG/1; MG/1
TABLET ORAL
Qty: 90 TABLET | Refills: 0 | OUTPATIENT
Start: 2023-03-24

## 2023-03-24 RX ORDER — PRIMIDONE 50 MG/1
TABLET ORAL
Qty: 270 TABLET | Refills: 0 | OUTPATIENT
Start: 2023-03-24

## 2023-03-27 RX ORDER — LOSARTAN POTASSIUM AND HYDROCHLOROTHIAZIDE 12.5; 1 MG/1; MG/1
1 TABLET ORAL DAILY
Qty: 90 TABLET | Refills: 0 | Status: SHIPPED | OUTPATIENT
Start: 2023-03-27

## 2023-03-27 RX ORDER — PRIMIDONE 50 MG/1
75 TABLET ORAL 2 TIMES DAILY
Qty: 270 TABLET | Refills: 0 | Status: SHIPPED | OUTPATIENT
Start: 2023-03-27

## 2023-04-07 DIAGNOSIS — G25.0 ESSENTIAL TREMOR: ICD-10-CM

## 2023-04-07 DIAGNOSIS — F51.04 PSYCHOPHYSIOLOGIC INSOMNIA: ICD-10-CM

## 2023-04-07 RX ORDER — MIRTAZAPINE 15 MG/1
TABLET, FILM COATED ORAL
Qty: 45 TABLET | Refills: 0 | OUTPATIENT
Start: 2023-04-07

## 2023-04-17 ENCOUNTER — OFFICE VISIT (OUTPATIENT)
Dept: FAMILY MEDICINE CLINIC | Facility: CLINIC | Age: 73
End: 2023-04-17
Payer: MEDICARE

## 2023-04-17 VITALS
SYSTOLIC BLOOD PRESSURE: 132 MMHG | DIASTOLIC BLOOD PRESSURE: 80 MMHG | WEIGHT: 170 LBS | BODY MASS INDEX: 27.32 KG/M2 | HEIGHT: 66 IN

## 2023-04-17 DIAGNOSIS — F51.04 PSYCHOPHYSIOLOGIC INSOMNIA: ICD-10-CM

## 2023-04-17 DIAGNOSIS — I10 ESSENTIAL (PRIMARY) HYPERTENSION: Primary | ICD-10-CM

## 2023-04-17 DIAGNOSIS — I48.0 PAF (PAROXYSMAL ATRIAL FIBRILLATION) (HCC): ICD-10-CM

## 2023-04-17 DIAGNOSIS — K21.9 GASTROESOPHAGEAL REFLUX DISEASE WITHOUT ESOPHAGITIS: ICD-10-CM

## 2023-04-17 DIAGNOSIS — J30.9 ALLERGIC RHINITIS, UNSPECIFIED SEASONALITY, UNSPECIFIED TRIGGER: ICD-10-CM

## 2023-04-17 PROCEDURE — 3079F DIAST BP 80-89 MM HG: CPT | Performed by: FAMILY MEDICINE

## 2023-04-17 PROCEDURE — 1123F ACP DISCUSS/DSCN MKR DOCD: CPT | Performed by: FAMILY MEDICINE

## 2023-04-17 PROCEDURE — 99214 OFFICE O/P EST MOD 30 MIN: CPT | Performed by: FAMILY MEDICINE

## 2023-04-17 PROCEDURE — 3075F SYST BP GE 130 - 139MM HG: CPT | Performed by: FAMILY MEDICINE

## 2023-04-17 RX ORDER — ZOLPIDEM TARTRATE 10 MG/1
10 TABLET ORAL NIGHTLY PRN
Qty: 30 TABLET | Refills: 5 | Status: SHIPPED | OUTPATIENT
Start: 2023-04-17 | End: 2023-05-17

## 2023-04-17 RX ORDER — ZOLPIDEM TARTRATE 10 MG/1
10 TABLET ORAL NIGHTLY PRN
Qty: 30 TABLET | Refills: 5 | Status: SHIPPED | OUTPATIENT
Start: 2023-04-17 | End: 2023-04-17 | Stop reason: SDUPTHER

## 2023-04-17 ASSESSMENT — PATIENT HEALTH QUESTIONNAIRE - PHQ9
SUM OF ALL RESPONSES TO PHQ9 QUESTIONS 1 & 2: 0
1. LITTLE INTEREST OR PLEASURE IN DOING THINGS: 0
2. FEELING DOWN, DEPRESSED OR HOPELESS: 0
SUM OF ALL RESPONSES TO PHQ QUESTIONS 1-9: 0

## 2023-04-17 ASSESSMENT — ENCOUNTER SYMPTOMS
CHEST TIGHTNESS: 0
ABDOMINAL PAIN: 0
BLOOD IN STOOL: 0
SHORTNESS OF BREATH: 0

## 2023-04-18 DIAGNOSIS — E78.00 PURE HYPERCHOLESTEROLEMIA: ICD-10-CM

## 2023-04-18 RX ORDER — ROSUVASTATIN CALCIUM 10 MG/1
10 TABLET, COATED ORAL DAILY
Qty: 90 TABLET | Refills: 0 | Status: SHIPPED | OUTPATIENT
Start: 2023-04-18

## 2023-04-20 ENCOUNTER — OFFICE VISIT (OUTPATIENT)
Dept: CARDIOLOGY CLINIC | Age: 73
End: 2023-04-20
Payer: MEDICARE

## 2023-04-20 VITALS
SYSTOLIC BLOOD PRESSURE: 138 MMHG | HEART RATE: 60 BPM | BODY MASS INDEX: 29.02 KG/M2 | DIASTOLIC BLOOD PRESSURE: 68 MMHG | WEIGHT: 180.6 LBS | HEIGHT: 66 IN

## 2023-04-20 DIAGNOSIS — I10 ESSENTIAL (PRIMARY) HYPERTENSION: ICD-10-CM

## 2023-04-20 DIAGNOSIS — I48.0 PAF (PAROXYSMAL ATRIAL FIBRILLATION) (HCC): Primary | ICD-10-CM

## 2023-04-20 DIAGNOSIS — E78.2 MIXED HYPERLIPIDEMIA: ICD-10-CM

## 2023-04-20 PROCEDURE — 1123F ACP DISCUSS/DSCN MKR DOCD: CPT | Performed by: INTERNAL MEDICINE

## 2023-04-20 PROCEDURE — 3078F DIAST BP <80 MM HG: CPT | Performed by: INTERNAL MEDICINE

## 2023-04-20 PROCEDURE — 3075F SYST BP GE 130 - 139MM HG: CPT | Performed by: INTERNAL MEDICINE

## 2023-04-20 PROCEDURE — 99214 OFFICE O/P EST MOD 30 MIN: CPT | Performed by: INTERNAL MEDICINE

## 2023-04-20 RX ORDER — METOPROLOL SUCCINATE 100 MG/1
TABLET, EXTENDED RELEASE ORAL
Qty: 90 TABLET | Refills: 0 | OUTPATIENT
Start: 2023-04-20

## 2023-04-20 ASSESSMENT — ENCOUNTER SYMPTOMS: SHORTNESS OF BREATH: 0

## 2023-04-20 NOTE — PROGRESS NOTES
[address], [address] [place] [address]  PHONE: [phone]    Jinny Ballesteros  1950      SUBJECTIVE:   Jinny Ballesteros is a 67 y.o. female seen for a follow up visit regarding the following:     Chief Complaint   Patient presents with    Hypertension    Atrial Fibrillation    Results     CT calcium score       HPI:    Patient presents for follow-up. She has had no sustained tachycardia or atrial fibrillation. She was noted by her PCP to have elevated lipid panel. Apparently her PCP and [place] has stopped her statin therapy. Her local PCP checked a CT calcium score which was mildly elevated as outlined below:    Cardiac CT (4/17/23): CT calcium score 31    It was noted on her CT calcium score that she had a large left atrium measuring 5.2 cm in the AP direction and dense mitral annular calcification. Blood pressure appears to be well controlled. Past Medical History, Past Surgical History, Family history, Social History, and Medications were all reviewed with the patient today and updated as necessary. Current Outpatient Medications:     rosuvastatin (CRESTOR) 10 MG tablet, Take 1 tablet by mouth daily, Disp: 90 tablet, Rfl: 0    zolpidem (AMBIEN) 10 MG tablet, Take 1 tablet by mouth nightly as needed for Sleep for up to 30 days.  Max Daily Amount: 10 mg, Disp: 30 tablet, Rfl: 5    losartan-hydroCHLOROthiazide (HYZAAR) 100-12.5 MG per tablet, Take 1 tablet by mouth daily, Disp: 90 tablet, Rfl: 0    rOPINIRole (REQUIP) 1 MG tablet, Take 1.5 tablets by mouth 3 times daily (Patient taking differently: Take 2 tablets by mouth nightly), Disp: 405 tablet, Rfl: 0    metoprolol succinate (TOPROL XL) 100 MG extended release tablet, Take 1 tablet by mouth daily, Disp: 90 tablet, Rfl: 0    vitamin B-12 (CYANOCOBALAMIN) 100 MCG tablet, Take 0.5 tablets by mouth daily, Disp: , Rfl:     estradiol (ESTRACE VAGINAL) 0.1 MG/GM vaginal cream, 0.5g applied vaginally

## 2023-05-02 ENCOUNTER — TELEPHONE (OUTPATIENT)
Dept: CARDIOLOGY CLINIC | Age: 73
End: 2023-05-02

## 2023-05-02 NOTE — TELEPHONE ENCOUNTER
Patient called with results, voiced understanding and thanked me//royer    ----- Message from Edwin Sweeney MD sent at 5/1/2023  8:55 PM EDT -----  Please call patient. Let her know her echo shows normal heart function. She does have calcification of her mitral valve which is not uncommon. Fortunately, it has not affected her valve function and her valve functions normally with only mild mitral regurgitation. Her left atrium is moderately enlarged which is also common at this stage of life and people who have a history of atrial fibrillation. No medication changes at the present time. We will see her back in 1 year unless she has worsening issues.   Thank you

## 2023-05-03 DIAGNOSIS — I10 ESSENTIAL (PRIMARY) HYPERTENSION: ICD-10-CM

## 2023-05-03 RX ORDER — METOPROLOL SUCCINATE 100 MG/1
100 TABLET, EXTENDED RELEASE ORAL DAILY
Qty: 90 TABLET | Refills: 0 | Status: SHIPPED | OUTPATIENT
Start: 2023-05-03

## 2023-05-26 DIAGNOSIS — G25.0 ESSENTIAL TREMOR: ICD-10-CM

## 2023-05-30 RX ORDER — ROPINIROLE 1 MG/1
TABLET, FILM COATED ORAL
Qty: 270 TABLET | OUTPATIENT
Start: 2023-05-30

## 2023-06-08 DIAGNOSIS — I10 ESSENTIAL (PRIMARY) HYPERTENSION: ICD-10-CM

## 2023-06-08 DIAGNOSIS — G25.0 ESSENTIAL TREMOR: ICD-10-CM

## 2023-06-08 RX ORDER — LOSARTAN POTASSIUM AND HYDROCHLOROTHIAZIDE 12.5; 1 MG/1; MG/1
TABLET ORAL
Qty: 90 TABLET | Refills: 0 | OUTPATIENT
Start: 2023-06-08

## 2023-06-08 RX ORDER — PRIMIDONE 50 MG/1
75 TABLET ORAL 2 TIMES DAILY
Qty: 270 TABLET | Refills: 0 | OUTPATIENT
Start: 2023-06-08

## 2023-06-29 ENCOUNTER — TELEPHONE (OUTPATIENT)
Dept: FAMILY MEDICINE CLINIC | Facility: CLINIC | Age: 73
End: 2023-06-29

## 2023-07-14 DIAGNOSIS — I10 ESSENTIAL (PRIMARY) HYPERTENSION: ICD-10-CM

## 2023-07-14 DIAGNOSIS — E78.00 PURE HYPERCHOLESTEROLEMIA: ICD-10-CM

## 2023-07-17 RX ORDER — METOPROLOL SUCCINATE 100 MG/1
TABLET, EXTENDED RELEASE ORAL
Qty: 90 TABLET | Refills: 0 | OUTPATIENT
Start: 2023-07-17

## 2023-07-17 RX ORDER — ROSUVASTATIN CALCIUM 10 MG/1
TABLET, COATED ORAL
Qty: 90 TABLET | Refills: 0 | OUTPATIENT
Start: 2023-07-17

## (undated) DEVICE — BANDAGE,ELASTIC,ESMARK,STERILE,4"X9',LF: Brand: MEDLINE

## (undated) DEVICE — K WIRE FIX L152MM DIA0.9MM DST VOLAR RAD STD TIP GEMINUS
Type: IMPLANTABLE DEVICE | Site: WRIST | Status: NON-FUNCTIONAL
Removed: 2021-05-21

## (undated) DEVICE — SUTURE VCRL SZ 3-0 L27IN ABSRB UD L26MM SH 1/2 CIR J416H

## (undated) DEVICE — STERILE HOOK LOCK LATEX FREE ELASTIC BANDAGE 3INX5YD: Brand: HOOK LOCK™

## (undated) DEVICE — HAND PACK: Brand: MEDLINE INDUSTRIES, INC.

## (undated) DEVICE — ZIMMER® STERILE DISPOSABLE TOURNIQUET CUFF WITH PLC, DUAL PORT, SINGLE BLADDER, 18 IN. (46 CM)

## (undated) DEVICE — PREP SKN CHLRAPRP APL 26ML STR --

## (undated) DEVICE — DISPOSABLE BIPOLAR CODE, 12' (3.66 M): Brand: CONMED

## (undated) DEVICE — BIT DRL SLD SD CUT 2.5X40MM -- DISP

## (undated) DEVICE — SUT ETHLN 3-0 18IN PS2 BLK --

## (undated) DEVICE — DRAPE,HAND,STERILE: Brand: MEDLINE

## (undated) DEVICE — SLING ARM AD ULT

## (undated) DEVICE — DRESSING,GAUZE,XEROFORM,CURAD,1"X8",ST: Brand: CURAD

## (undated) DEVICE — GUIDE AIM 1.5MM --

## (undated) DEVICE — AMD ANTIMICROBIAL GAUZE SPONGES,12 PLY USP TYPE VII, 0.2% POLYHEXAMETHYLENE BIGUANIDE HCI (PHMB): Brand: CURITY

## (undated) DEVICE — DRAPE XR C ARM 41X74IN LF --

## (undated) DEVICE — DRIVER

## (undated) DEVICE — STERILE HOOK LOCK LATEX FREE ELASTIC BANDAGE 2INX5YD: Brand: HOOK LOCK™

## (undated) DEVICE — PADDING CAST W3INXL4YD COT BLEND MIC PLEAT UNDERCAST SPEC

## (undated) DEVICE — SUTURE ETHLN SZ 4-0 L18IN NONABSORBABLE BLK L19MM PS-2 3/8 1667H

## (undated) DEVICE — SOLUTION IV 1000ML 0.9% SOD CHL

## (undated) DEVICE — DRAPE, FILM SHEET, 44X65 STERILE: Brand: MEDLINE

## (undated) DEVICE — BIT DRL SLD SD CUT 2X40MM DISP --

## (undated) DEVICE — DRAPE,U/SHT,SPLIT,FILM,60X84,STERILE: Brand: MEDLINE